# Patient Record
Sex: MALE | Race: BLACK OR AFRICAN AMERICAN | Employment: FULL TIME | URBAN - METROPOLITAN AREA
[De-identification: names, ages, dates, MRNs, and addresses within clinical notes are randomized per-mention and may not be internally consistent; named-entity substitution may affect disease eponyms.]

---

## 2017-04-10 RX ORDER — PREDNISONE 20 MG/1
TABLET ORAL
Qty: 10 TABLET | Refills: 0 | OUTPATIENT
Start: 2017-04-10

## 2017-06-29 PROBLEM — N18.4 STAGE 4 CHRONIC KIDNEY DISEASE (HCC): Status: ACTIVE | Noted: 2017-06-29

## 2017-06-29 RX ORDER — CHOLECALCIFEROL (VITAMIN D3) 1250 MCG
50000 CAPSULE ORAL WEEKLY
COMMUNITY

## 2017-06-29 RX ORDER — HYDROCODONE BITARTRATE AND ACETAMINOPHEN 5; 325 MG/1; MG/1
1 TABLET ORAL EVERY 6 HOURS PRN
Status: ON HOLD | COMMUNITY
End: 2017-06-30

## 2017-06-30 ENCOUNTER — ANESTHESIA (OUTPATIENT)
Dept: SURGERY | Facility: HOSPITAL | Age: 42
End: 2017-06-30
Payer: MEDICAID

## 2017-06-30 ENCOUNTER — ANESTHESIA EVENT (OUTPATIENT)
Dept: SURGERY | Facility: HOSPITAL | Age: 42
End: 2017-06-30
Payer: MEDICAID

## 2017-06-30 ENCOUNTER — SURGERY (OUTPATIENT)
Age: 42
End: 2017-06-30

## 2017-06-30 ENCOUNTER — HOSPITAL ENCOUNTER (OUTPATIENT)
Facility: HOSPITAL | Age: 42
Setting detail: HOSPITAL OUTPATIENT SURGERY
Discharge: HOME | End: 2017-06-30
Attending: SURGERY | Admitting: SURGERY
Payer: MEDICAID

## 2017-06-30 VITALS
BODY MASS INDEX: 36.34 KG/M2 | WEIGHT: 259.56 LBS | HEART RATE: 77 BPM | OXYGEN SATURATION: 100 % | RESPIRATION RATE: 16 BRPM | HEIGHT: 71 IN | DIASTOLIC BLOOD PRESSURE: 82 MMHG | SYSTOLIC BLOOD PRESSURE: 130 MMHG | TEMPERATURE: 98 F

## 2017-06-30 PROBLEM — K42.9 UMBILICAL HERNIA WITHOUT OBSTRUCTION AND WITHOUT GANGRENE: Status: ACTIVE | Noted: 2017-06-30

## 2017-06-30 LAB — POTASSIUM SERPL-SCNC: 3.8 MMOL/L (ref 3.3–5.1)

## 2017-06-30 PROCEDURE — 84132 ASSAY OF SERUM POTASSIUM: CPT | Performed by: SURGERY

## 2017-06-30 PROCEDURE — 36415 COLL VENOUS BLD VENIPUNCTURE: CPT | Performed by: SURGERY

## 2017-06-30 PROCEDURE — 0WQF0ZZ REPAIR ABDOMINAL WALL, OPEN APPROACH: ICD-10-PCS | Performed by: SURGERY

## 2017-06-30 PROCEDURE — 0WHG43Z INSERTION OF INFUSION DEVICE INTO PERITONEAL CAVITY, PERCUTANEOUS ENDOSCOPIC APPROACH: ICD-10-PCS | Performed by: SURGERY

## 2017-06-30 DEVICE — CATH DLYS 62.5CM 2 CUF: Type: IMPLANTABLE DEVICE | Site: ABDOMEN | Status: FUNCTIONAL

## 2017-06-30 RX ORDER — HYDROMORPHONE HYDROCHLORIDE 1 MG/ML
0.2 INJECTION, SOLUTION INTRAMUSCULAR; INTRAVENOUS; SUBCUTANEOUS EVERY 5 MIN PRN
Status: DISCONTINUED | OUTPATIENT
Start: 2017-06-30 | End: 2017-06-30

## 2017-06-30 RX ORDER — ONDANSETRON 4 MG/1
4 TABLET, ORALLY DISINTEGRATING ORAL ONCE
Status: DISCONTINUED | OUTPATIENT
Start: 2017-06-30 | End: 2017-06-30

## 2017-06-30 RX ORDER — ONDANSETRON 2 MG/ML
4 INJECTION INTRAMUSCULAR; INTRAVENOUS ONCE AS NEEDED
Status: DISCONTINUED | OUTPATIENT
Start: 2017-06-30 | End: 2017-06-30

## 2017-06-30 RX ORDER — MORPHINE SULFATE 4 MG/ML
4 INJECTION, SOLUTION INTRAMUSCULAR; INTRAVENOUS EVERY 10 MIN PRN
Status: DISCONTINUED | OUTPATIENT
Start: 2017-06-30 | End: 2017-06-30

## 2017-06-30 RX ORDER — HALOPERIDOL 5 MG/ML
0.25 INJECTION INTRAMUSCULAR ONCE AS NEEDED
Status: DISCONTINUED | OUTPATIENT
Start: 2017-06-30 | End: 2017-06-30

## 2017-06-30 RX ORDER — HYDROCODONE BITARTRATE AND ACETAMINOPHEN 5; 325 MG/1; MG/1
2 TABLET ORAL AS NEEDED
Status: DISCONTINUED | OUTPATIENT
Start: 2017-06-30 | End: 2017-06-30

## 2017-06-30 RX ORDER — MORPHINE SULFATE 4 MG/ML
6 INJECTION, SOLUTION INTRAMUSCULAR; INTRAVENOUS EVERY 10 MIN PRN
Status: DISCONTINUED | OUTPATIENT
Start: 2017-06-30 | End: 2017-06-30

## 2017-06-30 RX ORDER — NALOXONE HYDROCHLORIDE 0.4 MG/ML
80 INJECTION, SOLUTION INTRAMUSCULAR; INTRAVENOUS; SUBCUTANEOUS AS NEEDED
Status: DISCONTINUED | OUTPATIENT
Start: 2017-06-30 | End: 2017-06-30

## 2017-06-30 RX ORDER — SODIUM CHLORIDE, SODIUM LACTATE, POTASSIUM CHLORIDE, CALCIUM CHLORIDE 600; 310; 30; 20 MG/100ML; MG/100ML; MG/100ML; MG/100ML
INJECTION, SOLUTION INTRAVENOUS CONTINUOUS
Status: DISCONTINUED | OUTPATIENT
Start: 2017-06-30 | End: 2017-06-30

## 2017-06-30 RX ORDER — METOCLOPRAMIDE 10 MG/1
10 TABLET ORAL ONCE
Status: COMPLETED | OUTPATIENT
Start: 2017-06-30 | End: 2017-06-30

## 2017-06-30 RX ORDER — SODIUM CHLORIDE 9 MG/ML
INJECTION, SOLUTION INTRAVENOUS CONTINUOUS
Status: DISCONTINUED | OUTPATIENT
Start: 2017-06-30 | End: 2017-06-30

## 2017-06-30 RX ORDER — HYDROCODONE BITARTRATE AND ACETAMINOPHEN 5; 325 MG/1; MG/1
1 TABLET ORAL EVERY 6 HOURS PRN
Qty: 30 TABLET | Refills: 0 | Status: SHIPPED | OUTPATIENT
Start: 2017-06-30 | End: 2017-07-10

## 2017-06-30 RX ORDER — ONDANSETRON 2 MG/ML
INJECTION INTRAMUSCULAR; INTRAVENOUS AS NEEDED
Status: DISCONTINUED | OUTPATIENT
Start: 2017-06-30 | End: 2017-06-30 | Stop reason: SURG

## 2017-06-30 RX ORDER — HYDROCODONE BITARTRATE AND ACETAMINOPHEN 5; 325 MG/1; MG/1
1 TABLET ORAL AS NEEDED
Status: DISCONTINUED | OUTPATIENT
Start: 2017-06-30 | End: 2017-06-30

## 2017-06-30 RX ORDER — BUPIVACAINE HYDROCHLORIDE 2.5 MG/ML
INJECTION, SOLUTION EPIDURAL; INFILTRATION; INTRACAUDAL AS NEEDED
Status: DISCONTINUED | OUTPATIENT
Start: 2017-06-30 | End: 2017-06-30 | Stop reason: HOSPADM

## 2017-06-30 RX ORDER — FAMOTIDINE 20 MG/1
20 TABLET ORAL ONCE
Status: COMPLETED | OUTPATIENT
Start: 2017-06-30 | End: 2017-06-30

## 2017-06-30 RX ORDER — SUCCINYLCHOLINE CHLORIDE 20 MG/ML
INJECTION INTRAMUSCULAR; INTRAVENOUS AS NEEDED
Status: DISCONTINUED | OUTPATIENT
Start: 2017-06-30 | End: 2017-06-30 | Stop reason: SURG

## 2017-06-30 RX ORDER — ROCURONIUM BROMIDE 10 MG/ML
INJECTION, SOLUTION INTRAVENOUS AS NEEDED
Status: DISCONTINUED | OUTPATIENT
Start: 2017-06-30 | End: 2017-06-30 | Stop reason: SURG

## 2017-06-30 RX ORDER — MORPHINE SULFATE 2 MG/ML
2 INJECTION, SOLUTION INTRAMUSCULAR; INTRAVENOUS EVERY 10 MIN PRN
Status: DISCONTINUED | OUTPATIENT
Start: 2017-06-30 | End: 2017-06-30

## 2017-06-30 RX ORDER — ACETAMINOPHEN 325 MG/1
650 TABLET ORAL ONCE
Status: COMPLETED | OUTPATIENT
Start: 2017-06-30 | End: 2017-06-30

## 2017-06-30 RX ORDER — HYDROMORPHONE HYDROCHLORIDE 1 MG/ML
0.6 INJECTION, SOLUTION INTRAMUSCULAR; INTRAVENOUS; SUBCUTANEOUS EVERY 5 MIN PRN
Status: DISCONTINUED | OUTPATIENT
Start: 2017-06-30 | End: 2017-06-30

## 2017-06-30 RX ORDER — LIDOCAINE HYDROCHLORIDE 10 MG/ML
INJECTION, SOLUTION EPIDURAL; INFILTRATION; INTRACAUDAL; PERINEURAL AS NEEDED
Status: DISCONTINUED | OUTPATIENT
Start: 2017-06-30 | End: 2017-06-30 | Stop reason: SURG

## 2017-06-30 RX ORDER — HYDROMORPHONE HYDROCHLORIDE 1 MG/ML
0.4 INJECTION, SOLUTION INTRAMUSCULAR; INTRAVENOUS; SUBCUTANEOUS EVERY 5 MIN PRN
Status: DISCONTINUED | OUTPATIENT
Start: 2017-06-30 | End: 2017-06-30

## 2017-06-30 RX ADMIN — ROCURONIUM BROMIDE 5 MG: 10 INJECTION, SOLUTION INTRAVENOUS at 07:53:00

## 2017-06-30 RX ADMIN — SUCCINYLCHOLINE CHLORIDE 140 MG: 20 INJECTION INTRAMUSCULAR; INTRAVENOUS at 07:54:00

## 2017-06-30 RX ADMIN — LIDOCAINE HYDROCHLORIDE 25 MG: 10 INJECTION, SOLUTION EPIDURAL; INFILTRATION; INTRACAUDAL; PERINEURAL at 07:54:00

## 2017-06-30 RX ADMIN — SODIUM CHLORIDE: 9 INJECTION, SOLUTION INTRAVENOUS at 07:50:00

## 2017-06-30 RX ADMIN — SODIUM CHLORIDE: 9 INJECTION, SOLUTION INTRAVENOUS at 09:15:00

## 2017-06-30 RX ADMIN — ONDANSETRON 4 MG: 2 INJECTION INTRAMUSCULAR; INTRAVENOUS at 08:15:00

## 2017-06-30 NOTE — ANESTHESIA PREPROCEDURE EVALUATION
Anesthesia PreOp Note    HPI:     Elie Smith is a 36year old male who presents for preoperative consultation requested by: Ritta Dakins, MD    Date of Surgery: 6/30/2017    Procedure(s):  LAPAROSCOPIC PERITONEAL DIALYSIS CATH INSERTION  LAPAROSCOPI Epic:  CeFAZolin Sodium (ANCEF/KEFZOL) IVPB premix 2 g 2 g Intravenous Once Harriet Machuca MD   lactated ringers infusion  Intravenous Continuous Rishabh Aguilar MD   metoprolol Tartrate (LOPRESSOR) tab 25 mg 25 mg Oral Once PRN Harriet Machuca MD     No cur negative ROS   Abdominal   (+) obese,     Abdomen: soft.   Bowel sounds: normal.             Anesthesia Plan:   ASA:  4  Plan:   General  Airway:  ETT  Informed Consent Plan and Risks Discussed With:  Patient  Discussed plan with:  CRNA, attending and surge

## 2017-06-30 NOTE — ANESTHESIA POSTPROCEDURE EVALUATION
Patient: Taylor Ortega    Procedure Summary     Date:  06/30/17 Room / Location:  [address] [place] [address] MAIN OR 05 / [address] [place] [address] MAIN OR    Anesthesia Start:  6115 Anesthesia Stop:  0966    Procedures:       LAPAROSCOPIC PERITONEAL DIALYSIS CATH INSERTION (N/A Abdomen)      PAULINA

## 2017-06-30 NOTE — BRIEF OP NOTE
Pre-Operative Diagnosis: renal failure, umbilical hernia     Post-Operative Diagnosis: renal failure, umbilical hernia     Procedure Performed:   Procedure(s):  laparoscopic placement of peritoneal dialysis catheter, repair umbilical hernia      Surgeon

## 2017-06-30 NOTE — H&P
6/30/2017     Samanthaes Q Axel Skiff is a 36year old male With the follow for several years for Decreasing renal function. He now has a hemodialysis catheter placed and wants to proceed with peritoneal dialysis catheter placement.   He states he is already spo    GENERAL HEALTH: , no weight loss, no fever or chills  SKIN: denies any unusual skin rashes or jaundice  HEENT: denies nasal congestion, sinus pain or sore throat;  RESPIRATORY: denies shortness of breath, wheezing or cough   CARDIOVASCULAR: denies romina all possible to again start when he gets back. As an umbilical hernia which we discussed pros and cons of fixing. They best proceed with repair the hernia at this time to avoid interruption of later date if becomes more symptomatic.   I sprayed the proced

## 2017-07-07 NOTE — OPERATIVE REPORT
[facility]    PATIENT'S NAME: Chiquis Khan   ATTENDING PHYSICIAN: Rishabh Pascual MD   OPERATING PHYSICIAN: Rishabh Pascual MD   PATIENT ACCOUNT#:   [id number]    LOCATION:  [address]  MEDICAL RECORD #:   [id number]       DATE OF B under direct vision. We then removed the port and closed the fascia with wide bites in a transverse fashion, 3-0 plain for the subcutaneous tissues and the skin closed with 4-0 Vicryl.     We then placed the port sites to put a scope and then used a graspe

## 2017-07-12 ENCOUNTER — LAB ENCOUNTER (OUTPATIENT)
Dept: LAB | Age: 42
End: 2017-07-12
Attending: ORTHOPAEDIC SURGERY
Payer: MEDICAID

## 2017-07-12 DIAGNOSIS — M25.462 KNEE EFFUSION, LEFT: ICD-10-CM

## 2017-07-12 PROBLEM — M25.562 ACUTE PAIN OF LEFT KNEE: Status: ACTIVE | Noted: 2017-07-12

## 2017-07-12 PROCEDURE — 89060 EXAM SYNOVIAL FLUID CRYSTALS: CPT

## 2017-07-13 LAB — CRYSTALS: NEGATIVE

## 2017-07-13 NOTE — PROGRESS NOTES
Called patient --feeling much better --said \" great\"   Crystals neg--if swelling pain redness should be seen in ER --will see next week -has appt

## 2017-07-18 PROBLEM — M22.42 PATELLA, CHONDROMALACIA, LEFT: Status: ACTIVE | Noted: 2017-07-18

## 2017-07-25 ENCOUNTER — HOSPITAL ENCOUNTER (EMERGENCY)
Facility: HOSPITAL | Age: 42
Discharge: HOME | End: 2017-07-25
Payer: MEDICAID

## 2017-07-25 ENCOUNTER — APPOINTMENT (OUTPATIENT)
Dept: GENERAL RADIOLOGY | Facility: HOSPITAL | Age: 42
End: 2017-07-25
Payer: MEDICAID

## 2017-07-25 VITALS
DIASTOLIC BLOOD PRESSURE: 88 MMHG | OXYGEN SATURATION: 97 % | RESPIRATION RATE: 20 BRPM | HEART RATE: 79 BPM | BODY MASS INDEX: 34.3 KG/M2 | TEMPERATURE: 98 F | HEIGHT: 71 IN | WEIGHT: 245 LBS | SYSTOLIC BLOOD PRESSURE: 133 MMHG

## 2017-07-25 DIAGNOSIS — M25.562 ACUTE PAIN OF LEFT KNEE: Primary | ICD-10-CM

## 2017-07-25 DIAGNOSIS — M25.40 JOINT EFFUSION: ICD-10-CM

## 2017-07-25 LAB
BASOPHILS NFR FLD: 0 %
COLOR FLD: YELLOW
EOSINOPHIL NFR FLD: 0 %
LYMPHOCYTES NFR FLD: 1 %
MONOCYTES NFR FLD: 6 %
NEUTROPHILS NFR CSF: 93 %
RBC # FLD: 26 /CUMM (ref ?–1)
WBC # FLD: 5886 /CUMM (ref ?–1)

## 2017-07-25 PROCEDURE — 96372 THER/PROPH/DIAG INJ SC/IM: CPT

## 2017-07-25 PROCEDURE — 87070 CULTURE OTHR SPECIMN AEROBIC: CPT | Performed by: EMERGENCY MEDICINE

## 2017-07-25 PROCEDURE — 89051 BODY FLUID CELL COUNT: CPT | Performed by: EMERGENCY MEDICINE

## 2017-07-25 PROCEDURE — 20610 DRAIN/INJ JOINT/BURSA W/O US: CPT

## 2017-07-25 PROCEDURE — 89050 BODY FLUID CELL COUNT: CPT | Performed by: EMERGENCY MEDICINE

## 2017-07-25 PROCEDURE — 89060 EXAM SYNOVIAL FLUID CRYSTALS: CPT | Performed by: EMERGENCY MEDICINE

## 2017-07-25 PROCEDURE — 87205 SMEAR GRAM STAIN: CPT | Performed by: EMERGENCY MEDICINE

## 2017-07-25 PROCEDURE — 73560 X-RAY EXAM OF KNEE 1 OR 2: CPT

## 2017-07-25 PROCEDURE — 99285 EMERGENCY DEPT VISIT HI MDM: CPT

## 2017-07-25 RX ORDER — LIDOCAINE HYDROCHLORIDE 10 MG/ML
20 INJECTION, SOLUTION EPIDURAL; INFILTRATION; INTRACAUDAL; PERINEURAL ONCE
Status: COMPLETED | OUTPATIENT
Start: 2017-07-25 | End: 2017-07-25

## 2017-07-25 RX ORDER — MORPHINE SULFATE 4 MG/ML
4 INJECTION, SOLUTION INTRAMUSCULAR; INTRAVENOUS ONCE
Status: COMPLETED | OUTPATIENT
Start: 2017-07-25 | End: 2017-07-25

## 2017-07-25 NOTE — ED PROVIDER NOTES
Patient Seen in: [facility] AND [facility] Emergency Department    History   Patient presents with:  Lower Extremity Injury (musculoskeletal)    Stated Complaint: L knee pain    HPI    79-year-old male with history of gout, left knee swelling presents evaluation above.    PSFH elements reviewed from today and agreed except as otherwise stated in HPI.     Physical Exam   ED Triage Vitals  BP: 151/75 [07/25/17 1240]  Pulse: 80 [07/25/17 1240]  Resp: 16 [07/25/17 1240]  Temp: 98.4 °F (36.9 °C) [07/25/17 1240]  Temp sr will likely reaccumulate, he would still like to proceed with aspiration. Arthrocentesis performed as documented, patient tolerated procedure without difficulty. Await cell counts, plan to discharge if no sign of infection.   Patient verbalizes understand

## 2017-07-25 NOTE — ED NOTES
Discharge instructions given to pt. Pt verbalized understanding of home care and to follow up with ortho. Pt denied further questions or concerns. Pt discharged in stable condition with wife.

## 2017-07-25 NOTE — ED INITIAL ASSESSMENT (HPI)
L knee pain and swelling, chronic hx of knee swelling with repeat drainage. Last drained 2 weeks ago. Has appt tomorrow but cannot wait until then.

## 2017-07-26 LAB — CRYSTALS FLD MICRO: POSITIVE

## 2018-02-22 ENCOUNTER — HOSPITAL ENCOUNTER (INPATIENT)
Facility: HOSPITAL | Age: 43
LOS: 1 days | Discharge: HOME | DRG: 640 | End: 2018-02-23
Attending: EMERGENCY MEDICINE | Admitting: HOSPITALIST
Payer: COMMERCIAL

## 2018-02-22 DIAGNOSIS — R11.2 NAUSEA AND VOMITING, INTRACTABILITY OF VOMITING NOT SPECIFIED, UNSPECIFIED VOMITING TYPE: ICD-10-CM

## 2018-02-22 DIAGNOSIS — E86.0 DEHYDRATION: ICD-10-CM

## 2018-02-22 DIAGNOSIS — N18.6 ESRD (END STAGE RENAL DISEASE) (HCC): Primary | ICD-10-CM

## 2018-02-22 DIAGNOSIS — N19 UREMIA: ICD-10-CM

## 2018-02-22 LAB
ALBUMIN SERPL-MCNC: 2.2 G/DL (ref 3.5–4.8)
ALP LIVER SERPL-CCNC: 142 U/L (ref 45–117)
ALT SERPL-CCNC: 186 U/L (ref 17–63)
AST SERPL-CCNC: 190 U/L (ref 15–41)
BASOPHILS # BLD AUTO: 0.03 X10(3) UL (ref 0–0.1)
BASOPHILS NFR BLD AUTO: 0.5 %
BILIRUB SERPL-MCNC: 0.8 MG/DL (ref 0.1–2)
BUN BLD-MCNC: 53 MG/DL (ref 8–20)
CALCIUM BLD-MCNC: 8.7 MG/DL (ref 8.3–10.3)
CHLORIDE: 98 MMOL/L (ref 101–111)
CO2: 32 MMOL/L (ref 22–32)
CREAT BLD-MCNC: 13.1 MG/DL (ref 0.7–1.3)
EOSINOPHIL # BLD AUTO: 0.1 X10(3) UL (ref 0–0.3)
EOSINOPHIL NFR BLD AUTO: 1.7 %
ERYTHROCYTE [DISTWIDTH] IN BLOOD BY AUTOMATED COUNT: 13.2 % (ref 11.5–16)
GLUCOSE BLD-MCNC: 97 MG/DL (ref 70–99)
HCT VFR BLD AUTO: 34.7 % (ref 37–53)
HGB BLD-MCNC: 11.3 G/DL (ref 13–17)
IMMATURE GRANULOCYTE COUNT: 0.02 X10(3) UL (ref 0–1)
IMMATURE GRANULOCYTE RATIO %: 0.3 %
LYMPHOCYTES # BLD AUTO: 1.32 X10(3) UL (ref 0.9–4)
LYMPHOCYTES NFR BLD AUTO: 22.6 %
M PROTEIN MFR SERPL ELPH: 6.8 G/DL (ref 6.1–8.3)
MCH RBC QN AUTO: 28.9 PG (ref 27–33.2)
MCHC RBC AUTO-ENTMCNC: 32.6 G/DL (ref 31–37)
MCV RBC AUTO: 88.7 FL (ref 80–99)
MONOCYTES # BLD AUTO: 0.8 X10(3) UL (ref 0.1–1)
MONOCYTES NFR BLD AUTO: 13.7 %
NEUTROPHIL ABS PRELIM: 3.56 X10 (3) UL (ref 1.3–6.7)
NEUTROPHILS # BLD AUTO: 3.56 X10(3) UL (ref 1.3–6.7)
NEUTROPHILS NFR BLD AUTO: 61.2 %
PLATELET # BLD AUTO: 496 10(3)UL (ref 150–450)
POTASSIUM SERPL-SCNC: 3.8 MMOL/L (ref 3.6–5.1)
RBC # BLD AUTO: 3.91 X10(6)UL (ref 4.3–5.7)
RED CELL DISTRIBUTION WIDTH-SD: 42.8 FL (ref 35.1–46.3)
SODIUM SERPL-SCNC: 139 MMOL/L (ref 136–144)
WBC # BLD AUTO: 5.8 X10(3) UL (ref 4–13)

## 2018-02-22 PROCEDURE — 99223 1ST HOSP IP/OBS HIGH 75: CPT | Performed by: HOSPITALIST

## 2018-02-22 PROCEDURE — 99253 IP/OBS CNSLTJ NEW/EST LOW 45: CPT | Performed by: INTERNAL MEDICINE

## 2018-02-22 RX ORDER — ACETAMINOPHEN 325 MG/1
650 TABLET ORAL EVERY 6 HOURS PRN
Status: DISCONTINUED | OUTPATIENT
Start: 2018-02-22 | End: 2018-02-23

## 2018-02-22 RX ORDER — DOXAZOSIN 2 MG/1
2 TABLET ORAL EVERY MORNING
COMMUNITY

## 2018-02-22 RX ORDER — ALLOPURINOL 100 MG/1
100 TABLET ORAL EVERY EVENING
Status: DISCONTINUED | OUTPATIENT
Start: 2018-02-22 | End: 2018-02-23

## 2018-02-22 RX ORDER — SODIUM CHLORIDE 9 MG/ML
125 INJECTION, SOLUTION INTRAVENOUS CONTINUOUS
Status: DISCONTINUED | OUTPATIENT
Start: 2018-02-22 | End: 2018-02-23

## 2018-02-22 RX ORDER — ONDANSETRON 2 MG/ML
4 INJECTION INTRAMUSCULAR; INTRAVENOUS EVERY 6 HOURS PRN
Status: DISCONTINUED | OUTPATIENT
Start: 2018-02-22 | End: 2018-02-23

## 2018-02-22 RX ORDER — HYDRALAZINE HYDROCHLORIDE 50 MG/1
100 TABLET, FILM COATED ORAL 2 TIMES DAILY
Status: DISCONTINUED | OUTPATIENT
Start: 2018-02-22 | End: 2018-02-23

## 2018-02-22 RX ORDER — COLCHICINE 0.6 MG/1
0.6 TABLET ORAL DAILY
Status: DISCONTINUED | OUTPATIENT
Start: 2018-02-23 | End: 2018-02-23

## 2018-02-22 RX ORDER — TORSEMIDE 5 MG/1
5 TABLET ORAL EVERY EVENING
Status: DISCONTINUED | OUTPATIENT
Start: 2018-02-22 | End: 2018-02-23

## 2018-02-22 RX ORDER — CARVEDILOL 12.5 MG/1
25 TABLET ORAL 2 TIMES DAILY WITH MEALS
Status: DISCONTINUED | OUTPATIENT
Start: 2018-02-22 | End: 2018-02-23

## 2018-02-22 RX ORDER — ONDANSETRON 2 MG/ML
4 INJECTION INTRAMUSCULAR; INTRAVENOUS ONCE
Status: COMPLETED | OUTPATIENT
Start: 2018-02-22 | End: 2018-02-22

## 2018-02-22 RX ORDER — ERGOCALCIFEROL 1.25 MG/1
50000 CAPSULE ORAL WEEKLY
Status: DISCONTINUED | OUTPATIENT
Start: 2018-02-28 | End: 2018-02-23

## 2018-02-22 RX ORDER — TERAZOSIN 2 MG/1
2 CAPSULE ORAL NIGHTLY
Status: DISCONTINUED | OUTPATIENT
Start: 2018-02-22 | End: 2018-02-23

## 2018-02-22 RX ORDER — FOLIC ACID/VIT B COMPLEX AND C 0.8 MG
TABLET ORAL
COMMUNITY

## 2018-02-22 RX ORDER — ONDANSETRON 2 MG/ML
4 INJECTION INTRAMUSCULAR; INTRAVENOUS ONCE
Status: DISCONTINUED | OUTPATIENT
Start: 2018-02-22 | End: 2018-02-22

## 2018-02-22 RX ORDER — CINACALCET 30 MG/1
30 TABLET, FILM COATED ORAL
COMMUNITY

## 2018-02-22 RX ORDER — CINACALCET 30 MG/1
30 TABLET, FILM COATED ORAL
Status: DISCONTINUED | OUTPATIENT
Start: 2018-02-23 | End: 2018-02-23

## 2018-02-22 RX ORDER — CALCITRIOL 0.25 UG/1
0.5 CAPSULE, LIQUID FILLED ORAL EVERY EVENING
Status: DISCONTINUED | OUTPATIENT
Start: 2018-02-22 | End: 2018-02-23

## 2018-02-22 RX ORDER — AMLODIPINE BESYLATE 5 MG/1
10 TABLET ORAL EVERY EVENING
Status: DISCONTINUED | OUTPATIENT
Start: 2018-02-22 | End: 2018-02-23

## 2018-02-22 RX ORDER — SODIUM CHLORIDE 9 MG/ML
INJECTION, SOLUTION INTRAVENOUS ONCE
Status: COMPLETED | OUTPATIENT
Start: 2018-02-22 | End: 2018-02-22

## 2018-02-22 NOTE — ED PROVIDER NOTES
Patient Seen in: [facility] Emergency Department    History   Patient presents with:  Dizziness (neurologic)  Dehydration (metabolic/constitutional)    Stated Complaint: pt on home dialysis, pt c/o nvd, pt c/o light heaededness    HPI    This a 44-y pallor. Oropharynx clear, mucous membranes dry. Neck: supple, no rigidity   Lungs: good air exchange and clear   Heart: regular rate rhythm and no murmur   Abdomen: Soft and nontender. No abdominal masses.   No peritoneal signs   Extremities: no edema, n diagnosis)  Uremia  Nausea and vomiting, intractability of vomiting not specified, unspecified vomiting type  Dehydration    Disposition:  Admit  2/22/2018  5:21 pm    Follow-up:  No follow-up provider specified.       Medications Prescribed:  Current Disch

## 2018-02-22 NOTE — ED INITIAL ASSESSMENT (HPI)
Home peritoneal dialysis, indicating that it is not taking fluids off his body. C/o n/v and dry mouth.

## 2018-02-22 NOTE — CONSULTS
[facility]  Report of Consultation    Kim Vizcarra Patient Status:  Emergency    1975 MRN [id number]   Location [address] [facility] Attending Anand Ellsworth MD   Hosp Day # 0 PCP [facility]     Reason for Consultation 1 tablet by mouth once a week. Sevelamer Carbonate (RENVELA) 800 MG Oral Tab Take 800 mg by mouth 3 (three) times daily with meals. colchicine 0.6 MG Oral Tab Take 0.6 mg by mouth daily.    allopurinol (ZYLOPRIM) 100 MG Oral Tab Take 100 mg by mouth valarie BILT 0.8 02/22/2018   TP 6.8 02/22/2018    02/22/2018    02/22/2018         BUN (mg/dL)   Date Value   02/22/2018 53 (H)   12/06/2015 54 (H)   ----------  Creatinine (mg/dL)   Date Value   02/22/2018 13.10 (H)   12/06/2015 4.71 (H)   ------

## 2018-02-22 NOTE — H&P
[facility]  History and Physical     Susan Mendes Patient Status:  Emergency    1975 MRN [id number]   Location 656 [facility] Attending Jessy Munoz MD   Hosp Day # 0 PCP [facility]     Chief Complaint: na Disp:  Rfl:    colchicine 0.6 MG Oral Tab Take 0.6 mg by mouth daily. Disp:  Rfl:    allopurinol (ZYLOPRIM) 100 MG Oral Tab Take 100 mg by mouth daily. Disp:  Rfl:    calcitRIOL (ROCALTROL) 0.5 MCG Oral Cap Take 0.5 mcg by mouth daily.  Disp:  Rfl:        R with PD  2. Mild fluid OL  1. Minimal hypoxia when he talks  2. Monitor with PD  3. HTN  1. Cont. Home meds  4. Transaminase elevation  1. Likely due to congestion  2. Monitor with dialysis  3. Further w/u  If persists  5. LLE fx  1.  In boot      Quality:

## 2018-02-22 NOTE — ED NOTES
Pt sts does at home dialysis peritoneal sts he hasn't been getting all the fluid off. Patient c/o nausea/ vomiting trough the night.

## 2018-02-23 VITALS
OXYGEN SATURATION: 97 % | RESPIRATION RATE: 18 BRPM | SYSTOLIC BLOOD PRESSURE: 143 MMHG | TEMPERATURE: 98 F | HEART RATE: 69 BPM | DIASTOLIC BLOOD PRESSURE: 97 MMHG | BODY MASS INDEX: 35.7 KG/M2 | HEIGHT: 71 IN | WEIGHT: 255 LBS

## 2018-02-23 PROBLEM — R11.2 NAUSEA AND VOMITING: Status: ACTIVE | Noted: 2018-02-22

## 2018-02-23 LAB
ALBUMIN SERPL-MCNC: 1.9 G/DL (ref 3.5–4.8)
ALP LIVER SERPL-CCNC: 118 U/L (ref 45–117)
ALT SERPL-CCNC: 111 U/L (ref 17–63)
AST SERPL-CCNC: 72 U/L (ref 15–41)
BILIRUB SERPL-MCNC: 0.4 MG/DL (ref 0.1–2)
BUN BLD-MCNC: 45 MG/DL (ref 8–20)
BUN BLD-MCNC: 47 MG/DL (ref 8–20)
BUN BLD-MCNC: 49 MG/DL (ref 8–20)
CALCIUM BLD-MCNC: 7.9 MG/DL (ref 8.3–10.3)
CALCIUM BLD-MCNC: 8.2 MG/DL (ref 8.3–10.3)
CALCIUM BLD-MCNC: 8.2 MG/DL (ref 8.3–10.3)
CHLORIDE: 98 MMOL/L (ref 101–111)
CHLORIDE: 98 MMOL/L (ref 101–111)
CHLORIDE: 99 MMOL/L (ref 101–111)
CO2: 28 MMOL/L (ref 22–32)
CO2: 30 MMOL/L (ref 22–32)
CO2: 33 MMOL/L (ref 22–32)
CREAT BLD-MCNC: 11.7 MG/DL (ref 0.7–1.3)
CREAT BLD-MCNC: 11.9 MG/DL (ref 0.7–1.3)
CREAT BLD-MCNC: 12.2 MG/DL (ref 0.7–1.3)
GLUCOSE BLD-MCNC: 101 MG/DL (ref 70–99)
GLUCOSE BLD-MCNC: 93 MG/DL (ref 70–99)
GLUCOSE BLD-MCNC: 94 MG/DL (ref 70–99)
M PROTEIN MFR SERPL ELPH: 5.6 G/DL (ref 6.1–8.3)
POTASSIUM SERPL-SCNC: 2.7 MMOL/L (ref 3.6–5.1)
POTASSIUM SERPL-SCNC: 3 MMOL/L (ref 3.6–5.1)
POTASSIUM SERPL-SCNC: 4 MMOL/L (ref 3.6–5.1)
SODIUM SERPL-SCNC: 135 MMOL/L (ref 136–144)
SODIUM SERPL-SCNC: 137 MMOL/L (ref 136–144)
SODIUM SERPL-SCNC: 140 MMOL/L (ref 136–144)

## 2018-02-23 PROCEDURE — 99232 SBSQ HOSP IP/OBS MODERATE 35: CPT | Performed by: INTERNAL MEDICINE

## 2018-02-23 PROCEDURE — 99238 HOSP IP/OBS DSCHRG MGMT 30/<: CPT | Performed by: INTERNAL MEDICINE

## 2018-02-23 PROCEDURE — 3E1M39Z IRRIGATION OF PERITONEAL CAVITY USING DIALYSATE, PERCUTANEOUS APPROACH: ICD-10-PCS | Performed by: INTERNAL MEDICINE

## 2018-02-23 RX ORDER — POTASSIUM CHLORIDE 20 MEQ/1
40 TABLET, EXTENDED RELEASE ORAL DAILY
Status: DISCONTINUED | OUTPATIENT
Start: 2018-02-23 | End: 2018-02-23

## 2018-02-23 RX ORDER — POTASSIUM CHLORIDE 20 MEQ/1
40 TABLET, EXTENDED RELEASE ORAL ONCE
Status: COMPLETED | OUTPATIENT
Start: 2018-02-23 | End: 2018-02-23

## 2018-02-23 RX ORDER — FAMOTIDINE 10 MG/ML
20 INJECTION, SOLUTION INTRAVENOUS DAILY
Status: DISCONTINUED | OUTPATIENT
Start: 2018-02-23 | End: 2018-02-23

## 2018-02-23 NOTE — PROGRESS NOTES
NURSING ADMISSION NOTE      Patient admitted via Cart  Oriented to room. Safety precautions initiated. Bed in low position. Call light in reach. Pt AOx4. No c/o pain.  Admitted for peritoneal dialysis tonight and to sort out issues with his Audrey Strange

## 2018-02-23 NOTE — PROGRESS NOTES
[facility]  Nephrology Progress Note    Kim Vizcarra Patient Status:  Observation    1975 MRN [id number]   [facility] 4NW-A Attending Jessica Galo MD   Hosp Day # 0 PCP [facility]       SUBJECTIVE:  Feels better today alt (APRESOLINE) tab 100 mg 100 mg Oral BID   colchicine tab 0.6 mg 0.6 mg Oral Daily   AmLODIPine Besylate (NORVASC) tab 10 mg 10 mg Oral QPM   carvedilol (COREG) tab 25 mg 25 mg Oral BID with meals   torsemide (DEMADEX) tab 5 mg 5 mg Oral QPM   allopurinol (

## 2018-02-23 NOTE — PLAN OF CARE
GASTROINTESTINAL - ADULT    • Minimal or absence of nausea and vomiting Progressing        METABOLIC/FLUID AND ELECTROLYTES - ADULT    • Hemodynamic stability and optimal renal function maintained Progressing          Patient A+Ox4. Denies any pain.  BP maite

## 2018-02-23 NOTE — PLAN OF CARE
METABOLIC/FLUID AND ELECTROLYTES - ADULT    • Electrolytes maintained within normal limits Not Progressing          GASTROINTESTINAL - ADULT    • Minimal or absence of nausea and vomiting Progressing        METABOLIC/FLUID AND ELECTROLYTES - ADULT    • Hem

## 2018-02-23 NOTE — CM/SW NOTE
02/23/18 1100   CM/SW Referral Data   Referral Source Social Work (self-referral)   Reason for Referral Discharge planning   Informant Patient   Patient Info   Patient's Mental Status Alert;Oriented   Patient's Home Environment House   Patient lives wit

## 2018-02-23 NOTE — PAYOR COMM NOTE
--------------  ADMISSION REVIEW     Payor: Tyree Shipley #:  [id number]  Authorization Number: N/A    Admit date: 2/23/18  Admit time: 36       Admitting Physician: Anish Bernardo MD  Attending Physician:  Leia Valentin except as noted above.     Physical Exam[TS.1]   ED Triage Vitals [02/22/18 1301]  BP: 123/92  Pulse: 88  Resp: 18  Temp: 98.9 °F (37.2 °C)  Temp src: Temporal  SpO2: 100 %  O2 Device: None (Room air)[TS.2]    Current:[TS.1]BP (!) 138/103   Pulse 64   Temp type  Dehydration[TS. 2]    Disposition:[TS.1]  Admit  2/22/2018  5:21 pm[TS.2]    Present on Admission  Date Reviewed: 7/26/2017          ICD-10-CM Noted POA    ESRD (end stage renal disease) ([facility] [address] N18.6 Unknown Unknown[TS.2]     Chandler Dover MD on 2/ once a week. Disp:  Rfl:    Sevelamer Carbonate (RENVELA) 800 MG Oral Tab Take 800 mg by mouth 3 (three) times daily with meals. Disp:  Rfl:    colchicine 0.6 MG Oral Tab Take 0.6 mg by mouth daily.  Disp:  Rfl:    allopurinol (ZYLOPRIM) 100 MG Oral Tab [facility] in the last 72 hours. Imaging: Imaging data reviewed in Epic. ASSESSMENT / PLAN:[FA.1]     1. N/V  1. Likely due to uremia  2. Monitor with PD  2. Mild fluid OL  1. Minimal hypoxia when he talks  2. Monitor with PD  3. HTN  1. Cont. Home meds[FA.2]  4. Shantel Holt RN      0.9%  NaCl infusion        Date Action Dose Route User    2/22/2018 1424 New Bag 125 mL/hr Intravenous Mike Maria RN      0.9%  NaCl infusion     Date Action Dose Route User    2/22/2018 1546 New Bag (none) Intravenous Fan

## 2018-02-23 NOTE — PROGRESS NOTES
[facility]  Progress Note    Jessica Vale Patient Status:  Observation    1975 MRN [id number]   [facility] 4NW-A Attending Leodan Fernandes MD   Hosp Day # 0 PCP [facility]     CC: Nausea and vomiting    SUBJECTIVE:  Patient  02/23/2018   K 3.0 02/23/2018   CL 98 02/23/2018   CO2 30.0 02/23/2018   GLU 94 02/23/2018   CA 8.2 02/23/2018   ALB 1.9 02/23/2018   ALKPHO 118 02/23/2018   BILT 0.4 02/23/2018   TP 5.6 02/23/2018   AST 72 02/23/2018    02/23/2018 is expected to be discharge to: Home        Questions/concerns and Plan of care were discussed with patient   Follow-up with regular primary care physician [facility] within 1 week in office and nephrologist as directed    Sunshine Cam MD  2/23/2018

## 2018-02-23 NOTE — DISCHARGE SUMMARY
[facility]  Discharge Summary    Sherry Jackson Patient Status:  Inpatient    1975 MRN [id number]   [facility] 4NW-A Attending Elis Weston MD   [facility] Day # 0 [facility]     Date of Admission: 2018    Date of [address] blood pressure controlled. Elevated liver function tests possibly due to nausea and vomiting and hepatic congestion due to uremia and fluid overload, liver function test improving after dialysis.   Nephrologist okay to discharge today as tolerating diet wi Refills:  0     Vitamin D3 88386 units Caps      Take 50,000 Units by mouth once a week. Refills:  0        STOP taking these medications    colchicine 0.6 MG Tabs               Follow up Visits:  Follow-up with [facility] in 1 week    [facility]

## 2018-03-18 ENCOUNTER — HOSPITAL ENCOUNTER (INPATIENT)
Facility: HOSPITAL | Age: 43
LOS: 1 days | Discharge: HOME | DRG: 919 | End: 2018-03-20
Attending: EMERGENCY MEDICINE | Admitting: HOSPITALIST
Payer: COMMERCIAL

## 2018-03-18 DIAGNOSIS — K66.8 PNEUMOPERITONEUM: Primary | ICD-10-CM

## 2018-03-18 DIAGNOSIS — T85.611A PERITONEAL DIALYSIS CATHETER DYSFUNCTION, INITIAL ENCOUNTER (HCC): ICD-10-CM

## 2018-03-19 ENCOUNTER — APPOINTMENT (OUTPATIENT)
Dept: CT IMAGING | Facility: HOSPITAL | Age: 43
DRG: 919 | End: 2018-03-19
Attending: EMERGENCY MEDICINE
Payer: COMMERCIAL

## 2018-03-19 PROBLEM — K66.8 PNEUMOPERITONEUM: Status: ACTIVE | Noted: 2018-03-19

## 2018-03-19 PROBLEM — T85.611A PERITONEAL DIALYSIS CATHETER DYSFUNCTION, INITIAL ENCOUNTER (HCC): Status: ACTIVE | Noted: 2018-03-19

## 2018-03-19 PROBLEM — T85.611A: Status: ACTIVE | Noted: 2018-03-19

## 2018-03-19 LAB
ALBUMIN SERPL-MCNC: 2.3 G/DL (ref 3.5–4.8)
ALP LIVER SERPL-CCNC: 96 U/L (ref 45–117)
ALT SERPL-CCNC: 14 U/L (ref 17–63)
AST SERPL-CCNC: 12 U/L (ref 15–41)
BASOPHIL PERITONEAL FLUID: 0 %
BASOPHILS # BLD AUTO: 0.02 X10(3) UL (ref 0–0.1)
BASOPHILS # BLD AUTO: 0.04 X10(3) UL (ref 0–0.1)
BASOPHILS NFR BLD AUTO: 0.2 %
BASOPHILS NFR BLD AUTO: 0.5 %
BILIRUB SERPL-MCNC: 0.4 MG/DL (ref 0.1–2)
BILIRUB UR QL STRIP.AUTO: NEGATIVE
BUN BLD-MCNC: 37 MG/DL (ref 8–20)
BUN BLD-MCNC: 37 MG/DL (ref 8–20)
CALCIUM BLD-MCNC: 7.6 MG/DL (ref 8.3–10.3)
CALCIUM BLD-MCNC: 7.8 MG/DL (ref 8.3–10.3)
CHLORIDE: 103 MMOL/L (ref 101–111)
CHLORIDE: 105 MMOL/L (ref 101–111)
CLARITY UR REFRACT.AUTO: CLEAR
CO2: 30 MMOL/L (ref 22–32)
CO2: 33 MMOL/L (ref 22–32)
COLOR UR AUTO: YELLOW
CREAT BLD-MCNC: 9.87 MG/DL (ref 0.7–1.3)
CREAT BLD-MCNC: 9.94 MG/DL (ref 0.7–1.3)
EOSINOPHIL # BLD AUTO: 0.3 X10(3) UL (ref 0–0.3)
EOSINOPHIL # BLD AUTO: 0.31 X10(3) UL (ref 0–0.3)
EOSINOPHIL NFR BLD AUTO: 3.1 %
EOSINOPHIL NFR BLD AUTO: 3.4 %
EOSINOPHILS PERITONEAL FLUID: 0 %
ERYTHROCYTE [DISTWIDTH] IN BLOOD BY AUTOMATED COUNT: 13.9 % (ref 11.5–16)
ERYTHROCYTE [DISTWIDTH] IN BLOOD BY AUTOMATED COUNT: 14 % (ref 11.5–16)
GLUCOSE BLD-MCNC: 101 MG/DL (ref 70–99)
GLUCOSE BLD-MCNC: 88 MG/DL (ref 70–99)
GLUCOSE UR STRIP.AUTO-MCNC: NEGATIVE MG/DL
HCT VFR BLD AUTO: 27.6 % (ref 37–53)
HCT VFR BLD AUTO: 29.6 % (ref 37–53)
HGB BLD-MCNC: 9.1 G/DL (ref 13–17)
HGB BLD-MCNC: 9.6 G/DL (ref 13–17)
IMMATURE GRANULOCYTE COUNT: 0.03 X10(3) UL (ref 0–1)
IMMATURE GRANULOCYTE COUNT: 0.04 X10(3) UL (ref 0–1)
IMMATURE GRANULOCYTE RATIO %: 0.3 %
IMMATURE GRANULOCYTE RATIO %: 0.5 %
KETONES UR STRIP.AUTO-MCNC: NEGATIVE MG/DL
LEUKOCYTE ESTERASE UR QL STRIP.AUTO: NEGATIVE
LIPASE: 98 U/L (ref 73–393)
LYMPHOCYTE PERITONEAL FLUID: 6 %
LYMPHOCYTES # BLD AUTO: 1.93 X10(3) UL (ref 0.9–4)
LYMPHOCYTES # BLD AUTO: 2.05 X10(3) UL (ref 0.9–4)
LYMPHOCYTES NFR BLD AUTO: 20.8 %
LYMPHOCYTES NFR BLD AUTO: 21.9 %
M PROTEIN MFR SERPL ELPH: 6.3 G/DL (ref 6.1–8.3)
MCH RBC QN AUTO: 28.9 PG (ref 27–33.2)
MCH RBC QN AUTO: 29.5 PG (ref 27–33.2)
MCHC RBC AUTO-ENTMCNC: 32.4 G/DL (ref 31–37)
MCHC RBC AUTO-ENTMCNC: 33 G/DL (ref 31–37)
MCV RBC AUTO: 89.2 FL (ref 80–99)
MCV RBC AUTO: 89.6 FL (ref 80–99)
MONO/MAC/HISTIO PERITONEAL: 12 %
MONOCYTES # BLD AUTO: 1 X10(3) UL (ref 0.1–1)
MONOCYTES # BLD AUTO: 1.11 X10(3) UL (ref 0.1–1)
MONOCYTES NFR BLD AUTO: 11.3 %
MONOCYTES NFR BLD AUTO: 11.4 %
NEUTROPHIL ABS PRELIM: 5.5 X10 (3) UL (ref 1.3–6.7)
NEUTROPHIL ABS PRELIM: 6.33 X10 (3) UL (ref 1.3–6.7)
NEUTROPHILS # BLD AUTO: 5.5 X10(3) UL (ref 1.3–6.7)
NEUTROPHILS # BLD AUTO: 6.33 X10(3) UL (ref 1.3–6.7)
NEUTROPHILS NFR BLD AUTO: 62.3 %
NEUTROPHILS NFR BLD AUTO: 64.3 %
NEUTROPHILS PERITONEAL FLUID: 82 %
NITRITE UR QL STRIP.AUTO: NEGATIVE
PH UR STRIP.AUTO: 6 [PH] (ref 4.5–8)
PLATELET # BLD AUTO: 334 10(3)UL (ref 150–450)
PLATELET # BLD AUTO: 371 10(3)UL (ref 150–450)
POTASSIUM SERPL-SCNC: 3 MMOL/L (ref 3.6–5.1)
POTASSIUM SERPL-SCNC: 3 MMOL/L (ref 3.6–5.1)
PROT UR STRIP.AUTO-MCNC: 100 MG/DL
RBC # BLD AUTO: 3.08 X10(6)UL (ref 4.3–5.7)
RBC # BLD AUTO: 3.32 X10(6)UL (ref 4.3–5.7)
RBC PERITONEAL FLUID: NORMAL /MM3
RBC UR QL AUTO: NEGATIVE
RED CELL DISTRIBUTION WIDTH-SD: 45.4 FL (ref 35.1–46.3)
RED CELL DISTRIBUTION WIDTH-SD: 45.8 FL (ref 35.1–46.3)
SODIUM SERPL-SCNC: 142 MMOL/L (ref 136–144)
SODIUM SERPL-SCNC: 144 MMOL/L (ref 136–144)
SP GR UR STRIP.AUTO: 1.01 (ref 1–1.03)
TOTAL CELLS COUNTED: 100
UROBILINOGEN UR STRIP.AUTO-MCNC: <2 MG/DL
WBC # BLD AUTO: 8.8 X10(3) UL (ref 4–13)
WBC # BLD AUTO: 9.9 X10(3) UL (ref 4–13)
WBC PERITONEAL FLUID: NORMAL /MM3

## 2018-03-19 PROCEDURE — 99223 1ST HOSP IP/OBS HIGH 75: CPT | Performed by: INTERNAL MEDICINE

## 2018-03-19 PROCEDURE — 74176 CT ABD & PELVIS W/O CONTRAST: CPT | Performed by: EMERGENCY MEDICINE

## 2018-03-19 PROCEDURE — 99220 INITIAL OBSERVATION CARE,LEVL III: CPT | Performed by: SURGERY

## 2018-03-19 PROCEDURE — 99220 INITIAL OBSERVATION CARE,LEVL III: CPT | Performed by: HOSPITALIST

## 2018-03-19 RX ORDER — ASCORBIC ACID, THIAMINE, RIBOFLAVIN, NIACINAMIDE, PYRIDOXINE, FOLIC ACID, COBALAMIN, BIOTIN, PANTOTHENIC ACID 100; 1.5; 1.7; 20; 10; 1; 6; 300; 1 MG/1; MG/1; MG/1; MG/1; MG/1; MG/1; UG/1; UG/1; MG/1
1 TABLET, COATED ORAL DAILY
Status: DISCONTINUED | OUTPATIENT
Start: 2018-03-19 | End: 2018-03-20

## 2018-03-19 RX ORDER — CINACALCET 30 MG/1
30 TABLET, FILM COATED ORAL
Status: DISCONTINUED | OUTPATIENT
Start: 2018-03-19 | End: 2018-03-19

## 2018-03-19 RX ORDER — AMLODIPINE BESYLATE 5 MG/1
10 TABLET ORAL DAILY
Status: DISCONTINUED | OUTPATIENT
Start: 2018-03-19 | End: 2018-03-20

## 2018-03-19 RX ORDER — CARVEDILOL 12.5 MG/1
25 TABLET ORAL 2 TIMES DAILY WITH MEALS
Status: DISCONTINUED | OUTPATIENT
Start: 2018-03-19 | End: 2018-03-20

## 2018-03-19 RX ORDER — TERAZOSIN 2 MG/1
2 CAPSULE ORAL DAILY
Status: DISCONTINUED | OUTPATIENT
Start: 2018-03-19 | End: 2018-03-20

## 2018-03-19 RX ORDER — HYDRALAZINE HYDROCHLORIDE 50 MG/1
100 TABLET, FILM COATED ORAL EVERY 8 HOURS SCHEDULED
Status: DISCONTINUED | OUTPATIENT
Start: 2018-03-19 | End: 2018-03-20

## 2018-03-19 RX ORDER — TORSEMIDE 5 MG/1
10 TABLET ORAL DAILY
Status: DISCONTINUED | OUTPATIENT
Start: 2018-03-19 | End: 2018-03-20

## 2018-03-19 RX ORDER — ONDANSETRON 2 MG/ML
4 INJECTION INTRAMUSCULAR; INTRAVENOUS EVERY 6 HOURS PRN
Status: DISCONTINUED | OUTPATIENT
Start: 2018-03-19 | End: 2018-03-20

## 2018-03-19 RX ORDER — MORPHINE SULFATE 4 MG/ML
2 INJECTION, SOLUTION INTRAMUSCULAR; INTRAVENOUS EVERY 2 HOUR PRN
Status: DISCONTINUED | OUTPATIENT
Start: 2018-03-19 | End: 2018-03-20

## 2018-03-19 RX ORDER — MORPHINE SULFATE 4 MG/ML
1 INJECTION, SOLUTION INTRAMUSCULAR; INTRAVENOUS EVERY 2 HOUR PRN
Status: DISCONTINUED | OUTPATIENT
Start: 2018-03-19 | End: 2018-03-20

## 2018-03-19 RX ORDER — ALLOPURINOL 100 MG/1
100 TABLET ORAL DAILY
Status: DISCONTINUED | OUTPATIENT
Start: 2018-03-19 | End: 2018-03-20

## 2018-03-19 RX ORDER — MORPHINE SULFATE 4 MG/ML
4 INJECTION, SOLUTION INTRAMUSCULAR; INTRAVENOUS ONCE
Status: COMPLETED | OUTPATIENT
Start: 2018-03-19 | End: 2018-03-19

## 2018-03-19 RX ORDER — LACTULOSE 10 G/15ML
30 SOLUTION ORAL ONCE
Status: COMPLETED | OUTPATIENT
Start: 2018-03-19 | End: 2018-03-19

## 2018-03-19 RX ORDER — MORPHINE SULFATE 4 MG/ML
4 INJECTION, SOLUTION INTRAMUSCULAR; INTRAVENOUS EVERY 2 HOUR PRN
Status: DISCONTINUED | OUTPATIENT
Start: 2018-03-19 | End: 2018-03-20

## 2018-03-19 NOTE — PROGRESS NOTES
[facility] Dosing Service    Initial Pharmacokinetic Consult for Aminoglycoside Dosing      Anuj Crowder is a 39year old male who is being treated for peritonitis. Pharmacy has been asked to dose gentamicin  by Dr. Sil Arora.     He has No Known Allergie dialysis with q6h exchanges per protocol. 3.  Pharmacy will follow and monitor renal function changes, toxicity and efficacy. Pharmacy will continue to follow him. We appreciate the opportunity to assist in his care.     Leif Berg, RaisaD

## 2018-03-19 NOTE — PLAN OF CARE
Dialysis RN here and only able to obtain about 75mL of fluid aspirate from abdomen. Pain with flushing, dialysis RN requesting page to Dr. Elena Stewart before proceeding. Will send page and give MD number of dialysis RN.     Fluid aspirate was walked down to lab

## 2018-03-19 NOTE — PLAN OF CARE
1003: Will page Dr. Nola Quiroz from general surgery notified that pt is having resistance when dialysis RN is trying to aspirate fluid.  Will infuse 200mL before 2000mL first to see if he can aspirate the smaller amount first.     1043: Able to infuse 1243

## 2018-03-19 NOTE — ED INITIAL ASSESSMENT (HPI)
Patient her with abdominal pain to lower abdomen. Patient states his peritoneal dialysis catheter machine is showing that he has no drainage. Patient thinks the catheter may be obstructed. Patient states pain is worse to his lower right abdomen.

## 2018-03-19 NOTE — PROGRESS NOTES
[facility] dosing service    Initial Pharmacokinetic Consult for Vancomycin Dosing     Samanthaelva Dickerson is a 39year old male admitted on 3/18/18 who is being treated for peritonitis.   Pharmacy has been asked to dose Vancomycin by Dr. Mauricio Ordonez    He has No K kidneys. Unremarkable appendix. Based on the above:    1. This patient will receive a loading dose of Vancomycin  2 gm IVPB (25mg/kg, capped at 2g) x 1 dose today based upon, adjusted body weight, renal function, and pharmacokinetics.     2.  Pharmac

## 2018-03-19 NOTE — PAYOR COMM NOTE
--------------  ADMISSION REVIEW     Payor: Tyree Shipley #:  [id number]  Authorization Number: N/A    Admit date: N/A  Admit time: N/A       Admitting Physician: Efrain Templeton MD  Attending Physician:  Daljit Reyes Current:[SA.1]BP (!) 153/106   Pulse 84   Temp 98.1 °F (36.7 °C) (Temporal)   Resp 20   Ht 181.6 cm (5' 11.5\")   Wt 108.9 kg   SpO2 95%   BMI 33.01 kg/m²[SA.2]     Physical Exam   Constitutional: He is oriented to person, place, and time.  He appears well- CT scan of the abdomen pelvis was performed. Is a moderate amount. Pneumoperitoneum and small amount of ascites.   This may be related to peritoneal dialysis although hollow organ perforation could also have this appearance no hollow organ perforation is History of Present Illness: Jf Gusman is a 39year old male with a history of ESRD on PD and Essential hypertension who presents to the ER with complaints of abdominal pain and PD cath malfunction. [PT.1] Patient states last month he was having issue BP (!) 153/106   Pulse 84   Temp 98.1 °F (36.7 °C) (Temporal)   Resp 20   Ht 5' 11.5\" (1.816 m)   Wt 240 lb (108.9 kg)   SpO2 95%   BMI 33.01 kg/m²    General: No acute distress. Alert and oriented x 3. HEENT: Normocephalic atraumatic.  Moist mucous memb 3/19/2018 0745 Given 100 mg Oral Marian Aguilar, RN      alteplase (ACTIVASE) 2 mg in Sterile Water for Injection 2.2 mL IV push     Date Action Dose Route User    3/19/2018 0448 Given 2 mg Intravenous Sal Oconnor, RN      AmLODIPine Besylate (NOR

## 2018-03-19 NOTE — PLAN OF CARE
GASTROINTESTINAL - ADULT    • Minimal or absence of nausea and vomiting Progressing    • Maintains or returns to baseline bowel function Progressing        HEMATOLOGIC - ADULT    • Free from bleeding injury Progressing        METABOLIC/FLUID AND ELECTROLYT

## 2018-03-19 NOTE — H&P
[facility]  History and Physical     Delgadillo Luis Patient Status:  Emergency    1975 MRN [id number]   Location [facility] Attending Puneet Wilson MD   Hosp Day # 0 [place] [facility] [place] [place]     Chief Complaint: Topher Merritt Cholecalciferol (VITAMIN D3) 96922 units Oral Cap Take 50,000 Units by mouth once a week. Disp:  Rfl:    HydrALAZINE HCl (APRESOLINE) 100 MG Oral Tab Take 100 mg by mouth 2 (two) times daily.  Disp:  Rfl:    Sevelamer Carbonate (RENVELA) 800 MG Oral Tab / PLAN:     1. Abdominal pain, CT with free air- suspect d/t PD cath, no other acute findings on prelim CT report; pain may be related to PD cath malfunction  2. ESRD on PD   3. Anemia, chronic disease  4. Hypokalemia  5. Metabolic alkalosis  6.  Essential

## 2018-03-19 NOTE — ED NOTES
Pt returns from CT, requesting additional pain medication. Pt medicated as ordered.  Pt reports he does make a small amount of urine, attempting to provide sample at this time

## 2018-03-19 NOTE — ED PROVIDER NOTES
Patient Seen in: [facility] Emergency Department    History   Patient presents with:  Abdomen/Flank Pain (GI/)    Stated Complaint: abd pain     HPI    Patient is a 26-year-old gentleman with end-stage renal disease on peritoneal dialysis here wit Neck: Normal range of motion. Neck supple. Cardiovascular: Normal rate and intact distal pulses. Pulmonary/Chest: Effort normal. No respiratory distress. Abdominal: Soft. He exhibits no distension. There is tenderness.  There is no rebound and no gua ------------------------------------------------------------       MDM       CT scan of the abdomen pelvis was performed. Is a moderate amount. Pneumoperitoneum and small amount of ascites.   This may be related to peritoneal dialysis although hollow orga

## 2018-03-19 NOTE — PLAN OF CARE
1436: Dr. Payam Daniels paged about peritoneal fluid having gram positive rods and +4 WBCs. 1519: Gen surg would like Dr. Tracie Babinski notified as well and are ok with empirical abx if that is wanted. Also will page for diet orders and if pt can have suppository.  No B

## 2018-03-19 NOTE — CONSULTS
[facility]  Report of Consultation    Daljit Juares Patient Status:  Observation    1975 MRN [id number]   [facility] 3SW-A Attending Adonis House MD   Hosp Day # 0 PCP [facility]       Assessment / Plan:    1) ESRD- on H MG/ML injection 1 mg, 1 mg, Intravenous, Q2H PRN **OR** morphINE sulfate (PF) 4 MG/ML injection 2 mg, 2 mg, Intravenous, Q2H PRN **OR** morphINE sulfate (PF) 4 MG/ML injection 4 mg, 4 mg, Intravenous, Q2H PRN  •  torsemide (DEMADEX) tab 10 mg, 10 mg, Oral, Josephine  3/19/2018  9:48 AM

## 2018-03-19 NOTE — CONSULTS
[facility]  Report of Consultation    Khadra Richardson Patient Status:  Observation    1975 MRN [id number]   [facility] 3SW-A Attending Sudhir Avalos MD   Hosp Day # 0 PCP [facility]     Reason for Consultation:  Pneumoperit in this encounter. The physician obtained a history, independently performed a physical exam, and developed an assessment and plan. The physician performed all medical decision making.     Kristopher Devlin PA-C    History:  Past Medical History:   Diag activity, polydipsia and polyphagia  ENMT:  Negative for ear drainage, hearing loss and nasal drainage  Eyes:  Negative for eye discharge and vision loss  Gastrointestinal:  Pos for abdominal pain, pos for constipation, neg for anorexia, neg for diarrhea, 0501   GLU  101*  88   BUN  37*  37*   CREATSERUM  9.94*  9.87*   GFRAA  7*  7*   GFRNAA  6*  6*   CA  7.8*  7.6*   ALB  2.3*   --    NA  142  144   K  3.0*  3.0*   CL  103  105   CO2  33.0*  30.0   ALKPHO  96   --    AST  12*   --    ALT  14*   --    BILT pelvis. There is moderate left colon and sigmoid colon diverticulosis. No evidence of acute diverticulitis. No bowel wall thickening and noted.   There are few   borderline distended fluid-filled loops of the small bowel left upper quadrant may represent [facility])     Lower abdominal pain      Pneumoperitoneum     Plan:  1. Differential diagnosis includes PD catheter dysfunction, bacterial peritonitis, and perforated viscus. The first is felt to be most likely given lack of white count or fever.  Peritoneal flu

## 2018-03-20 VITALS
HEART RATE: 64 BPM | SYSTOLIC BLOOD PRESSURE: 106 MMHG | WEIGHT: 255.19 LBS | OXYGEN SATURATION: 95 % | HEIGHT: 71.5 IN | DIASTOLIC BLOOD PRESSURE: 74 MMHG | BODY MASS INDEX: 34.94 KG/M2 | RESPIRATION RATE: 20 BRPM | TEMPERATURE: 98 F

## 2018-03-20 LAB
BASOPHILS # BLD AUTO: 0.02 X10(3) UL (ref 0–0.1)
BASOPHILS NFR BLD AUTO: 0.2 %
BUN BLD-MCNC: 34 MG/DL (ref 8–20)
CALCIUM BLD-MCNC: 7.2 MG/DL (ref 8.3–10.3)
CHLORIDE: 102 MMOL/L (ref 101–111)
CO2: 29 MMOL/L (ref 22–32)
CREAT BLD-MCNC: 9.49 MG/DL (ref 0.7–1.3)
EOSINOPHIL # BLD AUTO: 0.37 X10(3) UL (ref 0–0.3)
EOSINOPHIL NFR BLD AUTO: 4.5 %
ERYTHROCYTE [DISTWIDTH] IN BLOOD BY AUTOMATED COUNT: 14 % (ref 11.5–16)
GLUCOSE BLD-MCNC: 88 MG/DL (ref 70–99)
HCT VFR BLD AUTO: 24.1 % (ref 37–53)
HGB BLD-MCNC: 7.9 G/DL (ref 13–17)
HGB BLD-MCNC: 8.3 G/DL (ref 13–17)
IMMATURE GRANULOCYTE COUNT: 0.02 X10(3) UL (ref 0–1)
IMMATURE GRANULOCYTE RATIO %: 0.2 %
LYMPHOCYTES # BLD AUTO: 1.7 X10(3) UL (ref 0.9–4)
LYMPHOCYTES NFR BLD AUTO: 20.5 %
MCH RBC QN AUTO: 29.4 PG (ref 27–33.2)
MCHC RBC AUTO-ENTMCNC: 32.8 G/DL (ref 31–37)
MCV RBC AUTO: 89.6 FL (ref 80–99)
MONOCYTES # BLD AUTO: 0.97 X10(3) UL (ref 0.1–1)
MONOCYTES NFR BLD AUTO: 11.7 %
NEUTROPHIL ABS PRELIM: 5.2 X10 (3) UL (ref 1.3–6.7)
NEUTROPHILS # BLD AUTO: 5.2 X10(3) UL (ref 1.3–6.7)
NEUTROPHILS NFR BLD AUTO: 62.9 %
PLATELET # BLD AUTO: 289 10(3)UL (ref 150–450)
POTASSIUM SERPL-SCNC: 3.2 MMOL/L (ref 3.6–5.1)
RBC # BLD AUTO: 2.69 X10(6)UL (ref 4.3–5.7)
RED CELL DISTRIBUTION WIDTH-SD: 45.3 FL (ref 35.1–46.3)
SODIUM SERPL-SCNC: 141 MMOL/L (ref 136–144)
VANCOMYCIN RANDOM: 22.9 UG/ML
WBC # BLD AUTO: 8.3 X10(3) UL (ref 4–13)

## 2018-03-20 PROCEDURE — 99233 SBSQ HOSP IP/OBS HIGH 50: CPT | Performed by: INTERNAL MEDICINE

## 2018-03-20 PROCEDURE — 99224 SUBSEQUENT OBSERVATION CARE: CPT | Performed by: SURGERY

## 2018-03-20 PROCEDURE — 99239 HOSP IP/OBS DSCHRG MGMT >30: CPT | Performed by: HOSPITALIST

## 2018-03-20 RX ORDER — ONDANSETRON 8 MG/1
8 TABLET, ORALLY DISINTEGRATING ORAL EVERY 4 HOURS PRN
Qty: 6 TABLET | Refills: 0 | Status: SHIPPED | OUTPATIENT
Start: 2018-03-20

## 2018-03-20 RX ORDER — HYDROCODONE BITARTRATE AND ACETAMINOPHEN 5; 325 MG/1; MG/1
1-2 TABLET ORAL EVERY 4 HOURS PRN
Qty: 8 TABLET | Refills: 0 | Status: ON HOLD | OUTPATIENT
Start: 2018-03-20 | End: 2018-03-24

## 2018-03-20 RX ORDER — POTASSIUM CHLORIDE 20 MEQ/1
20 TABLET, EXTENDED RELEASE ORAL ONCE
Status: COMPLETED | OUTPATIENT
Start: 2018-03-20 | End: 2018-03-20

## 2018-03-20 NOTE — PAYOR COMM NOTE
--------------  CONTINUED STAY REVIEW    Payor: [address] [place] [address]  Subscriber #:  [id number]  Authorization Number: N/A    Admit date: 3/20/18  Admit time: 1    Admitting Physician: Teresita Crabtree MD  Attending Physician:  Marylu Messina Dose Route User    3/19/2018 1749 Given 25 mg Oral Marian Aguilar RN      multivitamin (DIALYVITE - RENAL) tab 1 tablet     Date Action Dose Route User    3/20/2018 7847 Given 1 tablet Oral Bina Byrne RN      Gentamicin Sulfate (GARAMYCIN) 270 mg in sod

## 2018-03-20 NOTE — PROGRESS NOTES
All discharge instructions d/w patient at bedside. Patient stating to Rn, his friend Lemuel Gonzalez come to floor for discharge instructions, and is waiting for him in car by [facility] ER entrance.  Patient verbalized understanding of all discharge instructions, paperw

## 2018-03-20 NOTE — PROGRESS NOTES
[facility]  Progress Note    Anuj Roque Patient Status:  Observation    1975 MRN [id number]   [facility] 3SW-A Attending Prasanna Hutson MD   Hosp Day # 0 PCP [facility]     Subjective:  Pt without any abdominal pain.   Peggy Reid

## 2018-03-20 NOTE — PROGRESS NOTES
Patient seen and examined. States mild pain continues throughout abd. Dr. Laura Garcia in to see patient. Scripts given to patient for home Cropsey and home Zofran. Repeat hgb level 8.3. Ok to d/c to home today per Dr. Laura Garcia and Dr. Mauricio Ordonez.  Dr. Mauricio Ordonez notified Rn alessio

## 2018-03-20 NOTE — DISCHARGE SUMMARY
[facility] [place] [facility]  DISCHARGE SUMMARY     Sherry Jackson Patient Status:  Inpatient    1975 MRN [id number]   [facility] 3SW-A Attending Amol Germain MD   Hosp Day # 0 PCP [facility]     Date of Admission: 3/18/2018  Date of Kailey Delgadillo without acute abnormality. Patient was evaluated by general surgery and nephrology. PD fluid was sent for cell count which was consistent with peritonitis and gram stain showed GP organism.  Patient started on Vancomycin with good response in his abdominal drug:  Sevelamer Carbonate      Take 800 mg by mouth 3 (three) times daily with meals.    Refills:  0     SENSIPAR 30 MG Tabs  Generic drug:  Cinacalcet HCl      Take 30 mg by mouth daily with breakfast.   Refills:  0     torsemide 10 MG Tabs  Commonly know

## 2018-03-20 NOTE — PROGRESS NOTES
[facility]  Nephrology Progress Note    Anuj Castañeda Attending:  Rafy Real MD       Assessment and Plan:    1) ESRD- on HD -> now PD x 9 months, doing well and is compliant. Labs / volume OK.  PLAN- continue nightly PD with usual prescriptio 03/20/2018   .0 03/20/2018   CREATSERUM 9.49 03/20/2018   BUN 34 03/20/2018    03/20/2018   K 3.2 03/20/2018    03/20/2018   CO2 29.0 03/20/2018   GLU 88 03/20/2018   CA 7.2 03/20/2018       Imaging: All imaging studies reviewed.     Med

## 2018-03-20 NOTE — PROGRESS NOTES
[facility]  Progress Note     Patricia Kirkland Patient Status:  Inpatient    1975 MRN [id number]   [facility] 3SW-A Attending Syed Russell MD   Hosp Day # 0 PCP [facility]     Chief Complaint: abd pain    S: Patient feel allopurinol  100 mg Oral Daily   • AmLODIPine Besylate  10 mg Oral Daily   • Terazosin HCl  2 mg Oral Daily   • HydrALAZINE HCl  100 mg Oral Q8H [address]   • multivitamin  1 tablet Oral Daily   • torsemide  10 mg Oral Daily   • pantoprazole (PROTONIX) IV push

## 2018-03-22 ENCOUNTER — HOSPITAL ENCOUNTER (OUTPATIENT)
Facility: HOSPITAL | Age: 43
Setting detail: OBSERVATION
Discharge: HOME | End: 2018-03-24
Attending: EMERGENCY MEDICINE | Admitting: HOSPITALIST
Payer: MEDICAID

## 2018-03-22 DIAGNOSIS — R10.31 ABDOMINAL PAIN, RIGHT LOWER QUADRANT: Primary | ICD-10-CM

## 2018-03-22 DIAGNOSIS — T85.71XD PERITONITIS ASSOCIATED WITH PERITONEAL DIALYSIS, SUBSEQUENT ENCOUNTER (HCC): ICD-10-CM

## 2018-03-22 DIAGNOSIS — E87.6 HYPOKALEMIA: ICD-10-CM

## 2018-03-22 DIAGNOSIS — T85.691A PERITONEAL DIALYSIS CATHETER MECHANICAL COMPLICATION, INITIAL ENCOUNTER (HCC): ICD-10-CM

## 2018-03-22 DIAGNOSIS — N17.9 RENAL FAILURE (ARF), ACUTE ON CHRONIC (HCC): ICD-10-CM

## 2018-03-22 DIAGNOSIS — N18.9 RENAL FAILURE (ARF), ACUTE ON CHRONIC (HCC): ICD-10-CM

## 2018-03-22 PROBLEM — D64.9 ANEMIA: Status: ACTIVE | Noted: 2018-03-22

## 2018-03-22 LAB
ALBUMIN SERPL-MCNC: 2 G/DL (ref 3.5–4.8)
ALP LIVER SERPL-CCNC: 82 U/L (ref 45–117)
ALT SERPL-CCNC: 15 U/L (ref 17–63)
AST SERPL-CCNC: 15 U/L (ref 15–41)
BASOPHILS # BLD AUTO: 0.02 X10(3) UL (ref 0–0.1)
BASOPHILS NFR BLD AUTO: 0.3 %
BILIRUB SERPL-MCNC: 0.2 MG/DL (ref 0.1–2)
BUN BLD-MCNC: 45 MG/DL (ref 8–20)
CALCIUM BLD-MCNC: 7.2 MG/DL (ref 8.3–10.3)
CHLORIDE: 105 MMOL/L (ref 101–111)
CO2: 29 MMOL/L (ref 22–32)
CREAT BLD-MCNC: 11.3 MG/DL (ref 0.7–1.3)
EOSINOPHIL # BLD AUTO: 0.27 X10(3) UL (ref 0–0.3)
EOSINOPHIL NFR BLD AUTO: 4.2 %
ERYTHROCYTE [DISTWIDTH] IN BLOOD BY AUTOMATED COUNT: 13.8 % (ref 11.5–16)
GLUCOSE BLD-MCNC: 88 MG/DL (ref 70–99)
HCT VFR BLD AUTO: 26.9 % (ref 37–53)
HGB BLD-MCNC: 8.9 G/DL (ref 13–17)
IMMATURE GRANULOCYTE COUNT: 0.02 X10(3) UL (ref 0–1)
IMMATURE GRANULOCYTE RATIO %: 0.3 %
LYMPHOCYTES # BLD AUTO: 1.57 X10(3) UL (ref 0.9–4)
LYMPHOCYTES NFR BLD AUTO: 24.5 %
M PROTEIN MFR SERPL ELPH: 5.7 G/DL (ref 6.1–8.3)
MCH RBC QN AUTO: 29.4 PG (ref 27–33.2)
MCHC RBC AUTO-ENTMCNC: 33.1 G/DL (ref 31–37)
MCV RBC AUTO: 88.8 FL (ref 80–99)
MONOCYTES # BLD AUTO: 0.73 X10(3) UL (ref 0.1–1)
MONOCYTES NFR BLD AUTO: 11.4 %
NEUTROPHIL ABS PRELIM: 3.8 X10 (3) UL (ref 1.3–6.7)
NEUTROPHILS # BLD AUTO: 3.8 X10(3) UL (ref 1.3–6.7)
NEUTROPHILS NFR BLD AUTO: 59.3 %
PLATELET # BLD AUTO: 397 10(3)UL (ref 150–450)
POTASSIUM SERPL-SCNC: 3.3 MMOL/L (ref 3.6–5.1)
RBC # BLD AUTO: 3.03 X10(6)UL (ref 4.3–5.7)
RED CELL DISTRIBUTION WIDTH-SD: 45.1 FL (ref 35.1–46.3)
SODIUM SERPL-SCNC: 142 MMOL/L (ref 136–144)
WBC # BLD AUTO: 6.4 X10(3) UL (ref 4–13)

## 2018-03-22 RX ORDER — DEXTROSE MONOHYDRATE, SODIUM CHLORIDE, SODIUM LACTATE, CALCIUM CHLORIDE, MAGNESIUM CHLORIDE 2.5; 567; 392; 25.7; 15.2 G/100ML; MG/100ML; MG/100ML; MG/100ML; MG/100ML
SOLUTION INTRAPERITONEAL
Status: DISCONTINUED | OUTPATIENT
Start: 2018-03-23 | End: 2018-03-24

## 2018-03-22 RX ORDER — POTASSIUM CHLORIDE 20 MEQ/1
40 TABLET, EXTENDED RELEASE ORAL ONCE
Status: COMPLETED | OUTPATIENT
Start: 2018-03-22 | End: 2018-03-23

## 2018-03-22 RX ORDER — MORPHINE SULFATE 4 MG/ML
4 INJECTION, SOLUTION INTRAMUSCULAR; INTRAVENOUS ONCE
Status: COMPLETED | OUTPATIENT
Start: 2018-03-22 | End: 2018-03-22

## 2018-03-23 ENCOUNTER — ANESTHESIA EVENT (OUTPATIENT)
Dept: SURGERY | Facility: HOSPITAL | Age: 43
End: 2018-03-23
Payer: MEDICAID

## 2018-03-23 ENCOUNTER — SURGERY (OUTPATIENT)
Age: 43
End: 2018-03-23

## 2018-03-23 ENCOUNTER — APPOINTMENT (OUTPATIENT)
Dept: INTERVENTIONAL RADIOLOGY/VASCULAR | Facility: HOSPITAL | Age: 43
End: 2018-03-23
Attending: INTERNAL MEDICINE
Payer: MEDICAID

## 2018-03-23 ENCOUNTER — ANESTHESIA (OUTPATIENT)
Dept: SURGERY | Facility: HOSPITAL | Age: 43
End: 2018-03-23
Payer: MEDICAID

## 2018-03-23 PROBLEM — D63.1 ANEMIA IN ESRD (END-STAGE RENAL DISEASE) (HCC): Status: ACTIVE | Noted: 2018-03-22

## 2018-03-23 PROBLEM — D63.1 ANEMIA IN ESRD (END-STAGE RENAL DISEASE): Status: ACTIVE | Noted: 2018-03-22

## 2018-03-23 PROBLEM — N18.6 ANEMIA IN ESRD (END-STAGE RENAL DISEASE) (HCC): Status: ACTIVE | Noted: 2018-03-22

## 2018-03-23 PROBLEM — D63.1 ANEMIA IN ESRD (END-STAGE RENAL DISEASE)  (HCC): Status: ACTIVE | Noted: 2018-03-22

## 2018-03-23 PROBLEM — N18.6 ANEMIA IN ESRD (END-STAGE RENAL DISEASE): Status: ACTIVE | Noted: 2018-03-22

## 2018-03-23 PROBLEM — N18.6 ANEMIA IN ESRD (END-STAGE RENAL DISEASE)  (HCC): Status: ACTIVE | Noted: 2018-03-22

## 2018-03-23 LAB
ATRIAL RATE: 67 BPM
INR BLD: 1.07 (ref 0.9–1.1)
P AXIS: 5 DEGREES
P-R INTERVAL: 184 MS
PSA SERPL DL<=0.01 NG/ML-MCNC: 14.3 SECONDS (ref 12.4–14.7)
Q-T INTERVAL: 424 MS
QRS DURATION: 98 MS
QTC CALCULATION (BEZET): 448 MS
R AXIS: -46 DEGREES
T AXIS: -16 DEGREES
VENTRICULAR RATE: 67 BPM

## 2018-03-23 PROCEDURE — 99243 OFF/OP CNSLTJ NEW/EST LOW 30: CPT | Performed by: SURGERY

## 2018-03-23 PROCEDURE — 99244 OFF/OP CNSLTJ NEW/EST MOD 40: CPT | Performed by: INTERNAL MEDICINE

## 2018-03-23 PROCEDURE — 0W9G4ZX DRAINAGE OF PERITONEAL CAVITY, PERCUTANEOUS ENDOSCOPIC APPROACH, DIAGNOSTIC: ICD-10-PCS | Performed by: SURGERY

## 2018-03-23 PROCEDURE — 99220 INITIAL OBSERVATION CARE,LEVL III: CPT | Performed by: HOSPITALIST

## 2018-03-23 PROCEDURE — 0JH63WZ INSERTION OF TOTALLY IMPLANTABLE VASCULAR ACCESS DEVICE INTO CHEST SUBCUTANEOUS TISSUE AND FASCIA, PERCUTANEOUS APPROACH: ICD-10-PCS | Performed by: RADIOLOGY

## 2018-03-23 PROCEDURE — 0JWT33Z REVISION OF INFUSION DEVICE IN TRUNK SUBCUTANEOUS TISSUE AND FASCIA, PERCUTANEOUS APPROACH: ICD-10-PCS | Performed by: SURGERY

## 2018-03-23 PROCEDURE — B5191ZA FLUOROSCOPY OF INFERIOR VENA CAVA USING LOW OSMOLAR CONTRAST, GUIDANCE: ICD-10-PCS | Performed by: RADIOLOGY

## 2018-03-23 PROCEDURE — 49325 LAP REVISION PERM IP CATH: CPT | Performed by: SURGERY

## 2018-03-23 PROCEDURE — 06H033Z INSERTION OF INFUSION DEVICE INTO INFERIOR VENA CAVA, PERCUTANEOUS APPROACH: ICD-10-PCS | Performed by: RADIOLOGY

## 2018-03-23 RX ORDER — NALOXONE HYDROCHLORIDE 0.4 MG/ML
80 INJECTION, SOLUTION INTRAMUSCULAR; INTRAVENOUS; SUBCUTANEOUS AS NEEDED
Status: DISCONTINUED | OUTPATIENT
Start: 2018-03-23 | End: 2018-03-23 | Stop reason: HOSPADM

## 2018-03-23 RX ORDER — HYDROCODONE BITARTRATE AND ACETAMINOPHEN 5; 325 MG/1; MG/1
2 TABLET ORAL AS NEEDED
Status: DISCONTINUED | OUTPATIENT
Start: 2018-03-23 | End: 2018-03-23 | Stop reason: HOSPADM

## 2018-03-23 RX ORDER — MORPHINE SULFATE 4 MG/ML
2 INJECTION, SOLUTION INTRAMUSCULAR; INTRAVENOUS EVERY 5 MIN PRN
Status: DISCONTINUED | OUTPATIENT
Start: 2018-03-23 | End: 2018-03-23 | Stop reason: HOSPADM

## 2018-03-23 RX ORDER — AMLODIPINE BESYLATE 5 MG/1
10 TABLET ORAL DAILY
Status: DISCONTINUED | OUTPATIENT
Start: 2018-03-23 | End: 2018-03-24

## 2018-03-23 RX ORDER — ONDANSETRON 2 MG/ML
4 INJECTION INTRAMUSCULAR; INTRAVENOUS EVERY 6 HOURS PRN
Status: DISCONTINUED | OUTPATIENT
Start: 2018-03-23 | End: 2018-03-24

## 2018-03-23 RX ORDER — BUPIVACAINE HYDROCHLORIDE AND EPINEPHRINE 5; 5 MG/ML; UG/ML
INJECTION, SOLUTION EPIDURAL; INTRACAUDAL; PERINEURAL AS NEEDED
Status: DISCONTINUED | OUTPATIENT
Start: 2018-03-23 | End: 2018-03-23 | Stop reason: HOSPADM

## 2018-03-23 RX ORDER — MORPHINE SULFATE 4 MG/ML
4 INJECTION, SOLUTION INTRAMUSCULAR; INTRAVENOUS EVERY 2 HOUR PRN
Status: DISCONTINUED | OUTPATIENT
Start: 2018-03-23 | End: 2018-03-24

## 2018-03-23 RX ORDER — ONDANSETRON 2 MG/ML
4 INJECTION INTRAMUSCULAR; INTRAVENOUS AS NEEDED
Status: DISCONTINUED | OUTPATIENT
Start: 2018-03-23 | End: 2018-03-23 | Stop reason: HOSPADM

## 2018-03-23 RX ORDER — CINACALCET 30 MG/1
30 TABLET, FILM COATED ORAL
Status: DISCONTINUED | OUTPATIENT
Start: 2018-03-23 | End: 2018-03-24

## 2018-03-23 RX ORDER — MORPHINE SULFATE 4 MG/ML
1 INJECTION, SOLUTION INTRAMUSCULAR; INTRAVENOUS EVERY 2 HOUR PRN
Status: DISCONTINUED | OUTPATIENT
Start: 2018-03-23 | End: 2018-03-24

## 2018-03-23 RX ORDER — SODIUM CHLORIDE, SODIUM LACTATE, POTASSIUM CHLORIDE, CALCIUM CHLORIDE 600; 310; 30; 20 MG/100ML; MG/100ML; MG/100ML; MG/100ML
INJECTION, SOLUTION INTRAVENOUS CONTINUOUS
Status: DISCONTINUED | OUTPATIENT
Start: 2018-03-23 | End: 2018-03-23 | Stop reason: HOSPADM

## 2018-03-23 RX ORDER — TORSEMIDE 5 MG/1
5 TABLET ORAL DAILY
Status: DISCONTINUED | OUTPATIENT
Start: 2018-03-23 | End: 2018-03-24

## 2018-03-23 RX ORDER — TRAZODONE HYDROCHLORIDE 50 MG/1
50 TABLET ORAL NIGHTLY PRN
Status: DISCONTINUED | OUTPATIENT
Start: 2018-03-23 | End: 2018-03-24

## 2018-03-23 RX ORDER — LIDOCAINE HYDROCHLORIDE 10 MG/ML
INJECTION, SOLUTION INFILTRATION; PERINEURAL
Status: COMPLETED
Start: 2018-03-23 | End: 2018-03-23

## 2018-03-23 RX ORDER — HEPARIN SODIUM 5000 [USP'U]/ML
INJECTION, SOLUTION INTRAVENOUS; SUBCUTANEOUS
Status: COMPLETED
Start: 2018-03-23 | End: 2018-03-23

## 2018-03-23 RX ORDER — MIDAZOLAM HYDROCHLORIDE 1 MG/ML
INJECTION INTRAMUSCULAR; INTRAVENOUS
Status: COMPLETED
Start: 2018-03-23 | End: 2018-03-23

## 2018-03-23 RX ORDER — HEPARIN SODIUM 5000 [USP'U]/ML
5000 INJECTION, SOLUTION INTRAVENOUS; SUBCUTANEOUS EVERY 8 HOURS SCHEDULED
Status: DISCONTINUED | OUTPATIENT
Start: 2018-03-23 | End: 2018-03-24

## 2018-03-23 RX ORDER — DOCUSATE SODIUM 100 MG/1
100 CAPSULE, LIQUID FILLED ORAL 2 TIMES DAILY
Status: DISCONTINUED | OUTPATIENT
Start: 2018-03-23 | End: 2018-03-24

## 2018-03-23 RX ORDER — TERAZOSIN 2 MG/1
2 CAPSULE ORAL NIGHTLY
Status: DISCONTINUED | OUTPATIENT
Start: 2018-03-23 | End: 2018-03-24

## 2018-03-23 RX ORDER — HYDROCODONE BITARTRATE AND ACETAMINOPHEN 5; 325 MG/1; MG/1
1 TABLET ORAL AS NEEDED
Status: DISCONTINUED | OUTPATIENT
Start: 2018-03-23 | End: 2018-03-23 | Stop reason: HOSPADM

## 2018-03-23 RX ORDER — BISACODYL 10 MG
10 SUPPOSITORY, RECTAL RECTAL
Status: DISCONTINUED | OUTPATIENT
Start: 2018-03-23 | End: 2018-03-24

## 2018-03-23 RX ORDER — SENNOSIDES 8.6 MG
8.6 TABLET ORAL 2 TIMES DAILY
Status: DISCONTINUED | OUTPATIENT
Start: 2018-03-23 | End: 2018-03-24

## 2018-03-23 RX ORDER — HEPARIN SODIUM 1000 [USP'U]/ML
1.5 INJECTION, SOLUTION INTRAVENOUS; SUBCUTANEOUS ONCE
Status: DISCONTINUED | OUTPATIENT
Start: 2018-03-23 | End: 2018-03-24

## 2018-03-23 RX ORDER — CEFAZOLIN SODIUM 1 G/3ML
INJECTION, POWDER, FOR SOLUTION INTRAMUSCULAR; INTRAVENOUS
Status: COMPLETED
Start: 2018-03-23 | End: 2018-03-23

## 2018-03-23 RX ORDER — CEFAZOLIN SODIUM 1 G/3ML
INJECTION, POWDER, FOR SOLUTION INTRAMUSCULAR; INTRAVENOUS
Status: DISCONTINUED | OUTPATIENT
Start: 2018-03-23 | End: 2018-03-23 | Stop reason: HOSPADM

## 2018-03-23 RX ORDER — HYDRALAZINE HYDROCHLORIDE 50 MG/1
100 TABLET, FILM COATED ORAL 3 TIMES DAILY
Status: DISCONTINUED | OUTPATIENT
Start: 2018-03-23 | End: 2018-03-24

## 2018-03-23 RX ORDER — POLYETHYLENE GLYCOL 3350 17 G/17G
17 POWDER, FOR SOLUTION ORAL DAILY PRN
Status: DISCONTINUED | OUTPATIENT
Start: 2018-03-23 | End: 2018-03-24

## 2018-03-23 RX ORDER — SEVELAMER HYDROCHLORIDE 400 MG/1
800 TABLET, FILM COATED ORAL
Status: DISCONTINUED | OUTPATIENT
Start: 2018-03-23 | End: 2018-03-24

## 2018-03-23 RX ORDER — MORPHINE SULFATE 4 MG/ML
2 INJECTION, SOLUTION INTRAMUSCULAR; INTRAVENOUS EVERY 2 HOUR PRN
Status: DISCONTINUED | OUTPATIENT
Start: 2018-03-23 | End: 2018-03-24

## 2018-03-23 RX ORDER — LIDOCAINE HYDROCHLORIDE AND EPINEPHRINE 15; 5 MG/ML; UG/ML
INJECTION, SOLUTION EPIDURAL
Status: COMPLETED
Start: 2018-03-23 | End: 2018-03-23

## 2018-03-23 RX ORDER — METOCLOPRAMIDE HYDROCHLORIDE 5 MG/ML
10 INJECTION INTRAMUSCULAR; INTRAVENOUS AS NEEDED
Status: DISCONTINUED | OUTPATIENT
Start: 2018-03-23 | End: 2018-03-23 | Stop reason: HOSPADM

## 2018-03-23 NOTE — PROGRESS NOTES
Patient seen and examined    S- no complaints    General- NAD  Chest- CTAB  CVS- RRR  Abdo- soft, NT, BS+    Plan  General Surgery consult  Sunday Acevedo M.D.   [facility]

## 2018-03-23 NOTE — ED INITIAL ASSESSMENT (HPI)
Pt reports he does his own retroperitoneal dialysis at home. Was recently admitted for abdominal pain. Discharged on 3/20/18. States he got a new dialysis machine today and machine read \"low drainage\". Pt in need of dialysis.

## 2018-03-23 NOTE — CM/SW NOTE
Spoke with Concha from [address] who confirmed they will open a 6:30 am chair time T/T/S, but only on a temporary basis for 2 weeks. She has informed pt of this and he is in agreement. SW updated Dr Reyna Clemons. No further needs at this time.

## 2018-03-23 NOTE — CM/SW NOTE
Received MD order to arrange for outpt HD at [address] [place]. Spoke with Concha at [address] ([phone]) regarding plan for temporary outpt HD x 2 weeks.   She stated that no clinical information is required for this as pt would just be a transfer w

## 2018-03-23 NOTE — CM/SW NOTE
03/23/18 1200   CM/SW Screening   Referral Source Social Work (self-referral)   Information Source Chart review;Nursing rounds   Patient's Mental Status Alert;Oriented   Patient lives with Spouse  (family)   Patient Status Prior to Admission   Independe

## 2018-03-23 NOTE — H&P
[facility] HOSPITALIST  History and Physical     Demetres Gayla Espinoza Patient Status:  Observation    1975 MRN [id number]   [facility] 5NW-A Attending Benito Gonzalez MD   Hosp Day # 0 PCP [facility]     Chief Complaint: PD dysfunction breakfast. Disp:  Rfl:    Cholecalciferol (VITAMIN D3) 02070 units Oral Cap Take 50,000 Units by mouth once a week. Disp:  Rfl:    HydrALAZINE HCl (APRESOLINE) 100 MG Oral Tab Take 100 mg by mouth 3 (three) times daily.    Disp:  Rfl:    AmLODIPine Besyla 03/20/18   0505  03/22/18 2028   GLU  88  88   BUN  34*  45*   CREATSERUM  9.49*  11.30*   GFRAA  7*  6*   GFRNAA  6*  5*   CA  7.2*  7.2*   ALB   --   2.0*   NA  141  142   K  3.2*  3.3*   CL  102  105   CO2  29.0  29.0   ALKPHO   --   82   AST   --   1

## 2018-03-23 NOTE — ED PROVIDER NOTES
Patient Seen in: [facility] Emergency Department    History   Patient presents with:  Fatigue (constitutional, neurologic)  Dyspnea TORO SOB (respiratory)    Stated Complaint: needs dialysis    HPI    Home peritoneal dialysis machine still not workin Physical Exam    Pleasant -American male lying on an emergency department bed he is in no acute distress he is watching television. His HEENT exam is within normal limits other than some mildly pale conjunctivae. His mouth is dry.   His [facility] sinus rhythm T-wave flattening in the lateral leads Q waves inferiorly           ED Course as of Mar 23 0419  ------------------------------------------------------------       [facility]     Patient reports that he came to the emergency department basically to b complication, initial encounter [facility]) T85.691A 3/22/2018     Peritonitis associated with peritoneal dialysis, subsequent encounter [facility]) T85.71XD 3/22/2018

## 2018-03-23 NOTE — CONSULTS
[facility]  Report of Consultation    Jennifer Cherry Patient Status:  Observation    1975 MRN [id number]   [facility] 5NW-A Attending Winsome Yanez MD   Hosp Day # 0 PCP [facility]     Reason for Consultation:  ESRD on PD/p Intravenous, Q2H PRN **OR** morphINE sulfate (PF) 4 MG/ML injection 2 mg, 2 mg, Intravenous, Q2H PRN **OR** morphINE sulfate (PF) 4 MG/ML injection 4 mg, 4 mg, Intravenous, Q2H PRN  •  TraZODone HCl (DESYREL) tab 50 mg, 50 mg, Oral, Nightly PRN  •  docusat intact, moving all extremities  Skin: Warm and dry, no rashes      Laboratory Data:    Lab Results  Component Value Date   WBC 6.4 03/22/2018   HGB 8.9 03/22/2018   HCT 26.9 03/22/2018   .0 03/22/2018   CREATSERUM 11.30 03/22/2018   BUN 45 03/22/201 spoke with IR to place PC today    Jetta Nyhan, MD  3/23/2018  12:53 PM

## 2018-03-23 NOTE — PLAN OF CARE
CARDIOVASCULAR - ADULT    • Maintains optimal cardiac output and hemodynamic stability Progressing    • Absence of cardiac arrhythmias or at baseline Progressing        GASTROINTESTINAL - ADULT    • Minimal or absence of nausea and vomiting Progressing correctly. I called the help number on the PD machine multiple times as well as the [facility] help number to speak with someone to troubleshoot. I spoke with Doris and Laura Shell who helped assist me.  Doris said she would be sending a tech over before 0

## 2018-03-23 NOTE — PROGRESS NOTES
03/23/18 1734   Clinical Encounter Type   Visited With Patient not available   Continue Visiting Yes

## 2018-03-23 NOTE — CONSULTS
[facility]  History & Physical    Demetres Akilah Roque Patient Status:  Observation    1975 MRN [id number]   [facility] 5NW-A Attending Ephraim Rios MD   Hosp Day # 0 PCP [facility]     Date of Admission:  3/22/2018    History o Medications:    Prescriptions Prior to Admission:  ondansetron 8 MG Oral Tablet Dispersible Take 1 tablet (8 mg total) by mouth every 4 (four) hours as needed for Nausea.  Disp: 6 tablet Rfl: 0 Past Week at Unknown time   HYDROcodone-acetaminophen 5-325 MG decreased appetite, diarrhea and vomiting  Genitourinary:  Negative for dysuria and hematuria  Hema/Lymph:  Negative for easy bleeding and easy bruising  Integumentary:  Negative for pruritus and rash  Musculoskeletal:  Negative for bone/joint symptoms  Ne and without gangrene     Knee effusion, left     Acute pain of left knee     Patella, chondromalacia, left     ESRD (end stage renal disease) (HCC)     Uremia     Non-intractable vomiting with nausea     Nausea and vomiting     Dehydration     Pneumoperito was discharged home on vancomycin. The patient states that he has had intermittent difficulty with his peritoneal dialysis catheter since it was placed. Currently he is having difficulty with the outflow.   Abdomen-soft-distended-mildly tender to deep pal

## 2018-03-23 NOTE — PAYOR COMM NOTE
--------------  ADMISSION REVIEW     Payor: Tyree Shipley #:  [id number]  Authorization Number: N/A    Admit date: 03/22/18      Admitting Physician: Benito Gonzalez MD  Attending Physician:  Moe Munoz MD  [facility] ED Triage Vitals [03/22/18 2020]  BP: (!) 148/108  Pulse: 65  Resp: 20  Temp: 98.1 °F (36.7 °C)  Temp src: Temporal  SpO2: 100 %  O2 Device: None (Room air)    Current:BP (!) 162/103   Pulse 71   Temp 98.3 °F (36.8 °C) (Oral)   Resp 18   Ht 180.3 cm (5' order to dialyze himself. I discussed the case with Dr. Janae Newman. Patient was unable to call his dialysis nurse despite multiple attempts she did not answer the phone.   He is still having abdominal pain was noted to have recent bacterial peritonitis is on missing dialysis. No fevers, chills, nausea or vomiting. Past Surgical History: Past Surgical History:  06/2017: DIALYSIS ACCESS SYSTEM Right  1983: OTHER      Comment: wrist fx    Social History:  reports that he has never smoked.  He has never use (36.7 °C) (Temporal)   Resp 18   Ht 5' 11\" (1.803 m)   Wt 240 lb (108.9 kg)   SpO2 100%   BMI 33.47 kg/m²    General: No acute distress. Alert and oriented x 3. HEENT: Normocephalic atraumatic. Moist mucous membranes. EOM-I. PERRLA. Anicteric.   Neck: No 5. Hypokalemia       Quality:  · DVT Prophylaxis: heparin   · CODE status: full  · Phillips: none    Plan of care discussed with patient, rn     Ami Sampson MD  3/23/2018    GENERAL SURGERY CONSULT  History of Present Illness:  Anuj Dickerson is a(n) Intravenous Mariana Mercado, RN      Potassium Chloride ER (K-DUR M20) CR tab 40 mEq     Date Action Dose Route User    3/23/2018 0056 Given 40 mEq Oral Renee Paula, RN      [facility]) tab TABS 8.6 mg     Date Action Dose Route User    3/23/2018

## 2018-03-23 NOTE — PROGRESS NOTES
Patient admitted a/o X4 from home with family. Reports having RLQ abdominal pain. Reports that he gives himself peritoneal dialysis each night and has not gotten treatments for the past 2 days. Came to ED.  Spoke with Dr. Anna Frausto of nephrology--plans to rec

## 2018-03-24 VITALS
WEIGHT: 266 LBS | HEART RATE: 75 BPM | SYSTOLIC BLOOD PRESSURE: 139 MMHG | RESPIRATION RATE: 16 BRPM | OXYGEN SATURATION: 97 % | TEMPERATURE: 99 F | BODY MASS INDEX: 37.24 KG/M2 | HEIGHT: 71 IN | DIASTOLIC BLOOD PRESSURE: 87 MMHG

## 2018-03-24 LAB
BASOPHILS # BLD AUTO: 0.03 X10(3) UL (ref 0–0.1)
BASOPHILS NFR BLD AUTO: 0.4 %
EOSINOPHIL # BLD AUTO: 0.1 X10(3) UL (ref 0–0.3)
EOSINOPHIL NFR BLD AUTO: 1.2 %
ERYTHROCYTE [DISTWIDTH] IN BLOOD BY AUTOMATED COUNT: 13.9 % (ref 11.5–16)
HAV IGM SER QL: NONREACTIVE
HBV CORE IGM SER QL: NONREACTIVE
HBV SURFACE AG SERPL QL IA: NONREACTIVE
HCT VFR BLD AUTO: 28.4 % (ref 37–53)
HEPATITIS C VIRUS AB INTERPRETATION: NONREACTIVE
HGB BLD-MCNC: 9 G/DL (ref 13–17)
IMMATURE GRANULOCYTE COUNT: 0.05 X10(3) UL (ref 0–1)
IMMATURE GRANULOCYTE RATIO %: 0.6 %
LYMPHOCYTES # BLD AUTO: 1.13 X10(3) UL (ref 0.9–4)
LYMPHOCYTES NFR BLD AUTO: 13.5 %
MCH RBC QN AUTO: 28.3 PG (ref 27–33.2)
MCHC RBC AUTO-ENTMCNC: 31.7 G/DL (ref 31–37)
MCV RBC AUTO: 89.3 FL (ref 80–99)
MONOCYTES # BLD AUTO: 0.96 X10(3) UL (ref 0.1–1)
MONOCYTES NFR BLD AUTO: 11.5 %
NEUTROPHIL ABS PRELIM: 6.09 X10 (3) UL (ref 1.3–6.7)
NEUTROPHILS # BLD AUTO: 6.09 X10(3) UL (ref 1.3–6.7)
NEUTROPHILS NFR BLD AUTO: 72.8 %
PLATELET # BLD AUTO: 325 10(3)UL (ref 150–450)
RBC # BLD AUTO: 3.18 X10(6)UL (ref 4.3–5.7)
RED CELL DISTRIBUTION WIDTH-SD: 46 FL (ref 35.1–46.3)
VANCOMYCIN RANDOM: 14.3 UG/ML
WBC # BLD AUTO: 8.4 X10(3) UL (ref 4–13)

## 2018-03-24 PROCEDURE — 99217 OBSERVATION CARE DISCHARGE: CPT | Performed by: HOSPITALIST

## 2018-03-24 PROCEDURE — 99213 OFFICE O/P EST LOW 20 MIN: CPT | Performed by: INTERNAL MEDICINE

## 2018-03-24 RX ORDER — LIDOCAINE HYDROCHLORIDE AND EPINEPHRINE 10; 10 MG/ML; UG/ML
20 INJECTION, SOLUTION INFILTRATION; PERINEURAL ONCE
Status: DISCONTINUED | OUTPATIENT
Start: 2018-03-24 | End: 2018-03-24

## 2018-03-24 RX ORDER — HYDROCODONE BITARTRATE AND ACETAMINOPHEN 5; 325 MG/1; MG/1
1-2 TABLET ORAL EVERY 4 HOURS PRN
Qty: 10 TABLET | Refills: 0 | Status: SHIPPED | OUTPATIENT
Start: 2018-03-24

## 2018-03-24 RX ORDER — HEPARIN SODIUM 1000 [USP'U]/ML
1500 INJECTION, SOLUTION INTRAVENOUS; SUBCUTANEOUS
Status: DISCONTINUED | OUTPATIENT
Start: 2018-03-24 | End: 2018-03-24

## 2018-03-24 RX ORDER — HYDROCODONE BITARTRATE AND ACETAMINOPHEN 5; 325 MG/1; MG/1
1-2 TABLET ORAL EVERY 6 HOURS PRN
Status: DISCONTINUED | OUTPATIENT
Start: 2018-03-24 | End: 2018-03-24

## 2018-03-24 NOTE — PROGRESS NOTES
NURSING DISCHARGE NOTE    Discharged Home via Wheelchair. Accompanied by Family member and Support staff  Belongings Taken by patient/family. Discharge education completed. Disease information handouts provided to and reviewed with patient.  Kiley Meléndez

## 2018-03-24 NOTE — BRIEF OP NOTE
Pre-Operative Diagnosis: Nonfunctioning peritoneal dialysis catheter     Post-Operative Diagnosis: Nonfunctioning peritoneal dialysis catheter due to intra-abdominal adhesions     Procedure Performed:   Procedure(s):  DIAGNOSTIC LAPAROSCOPY, REPOSITIONING

## 2018-03-24 NOTE — PROGRESS NOTES
[facility]  Progress Note    Anuj Ericaravinder Guy Patient Status:  Observation    1975 MRN [id number]   [facility] 5NW-A Attending Patricio Kumar MD   Hosp Day # 0 PCP [facility]     Subjective:  Patient with some residual discomf

## 2018-03-24 NOTE — OPERATIVE REPORT
[facility]  Operative Note    Mace Rayo Location: OR   CSN [id number] MRN [id number]   Admission Date 3/22/2018 Operation Date 3/23/2018   Attending Physician Jamal Chung MD Operating Physician Barry Killian MD     Date of procedure:    Kathrine Irwin repeat cultures will be taken at the time of surgery.     Discussed with patient:  The potential risks and benefits of the procedure were discussed in detail with the patient including but not limited to bleeding, infection, seroma/ hematoma formation, post into the left abdomen. Irrigation fluid was instilled into the pelvis. The PD catheter was then uncapped and there was free flow of the irrigation fluid through the catheter. The catheter was then recapped.       The 5 mm port was removed under direct vi

## 2018-03-24 NOTE — PROGRESS NOTES
Patient seen and examined    S- no complaints  General- NAD  Chest- CTAB  CVS- RRR  Abdo- soft, NT, dressing +    Plan  Dc home  Script for 10 tabs of norco printed and signed  HD planned for Tuesday    SHAWNA Colunga

## 2018-03-24 NOTE — ANESTHESIA POSTPROCEDURE EVALUATION
[address] [facility] Patient Status:  Observation   Age/Gender 39year old male MRN [id number]   [facility] SURGERY Attending Gaurav Broussard MD   Hosp Day # 0 PCP [facility]       Anesthesia Post-op Note    Procedure(s):  L

## 2018-03-24 NOTE — PROGRESS NOTES
[facility]  Nephrology Progress Note    Mace Rayo Patient Status:  Observation    1975 MRN [id number]   [facility] 5NW-A Attending Jamal Chung MD   Hosp Day # 0 PCP [facility]       SUBJECTIVE:  Notes reviewed, kaykay (Porcine) 1000 UNIT/ML injection 1,500 Units 1,500 Units Intravenous PRN Dialysis   lidocaine-EPINEPHrine 1 %-1:240869 injection 20 mL 20 mL Subcutaneous Once   AmLODIPine Besylate (NORVASC) tab 10 mg 10 mg Oral Daily   Cinacalcet HCl (SENSIPAR) tab 30 mg AM

## 2018-03-27 NOTE — CM/SW NOTE
Patient discharged on 3/24/18 with no further needs identified.        03/27/18 0800   Discharge disposition   Discharged to: Home or Self   Name of [id number] [facility]   Outpatient services Dialysis   Discharge transportation Private car

## 2018-03-28 NOTE — DISCHARGE SUMMARY
[facility] HOSPITALIST  DISCHARGE SUMMARY     Anuj Peralta Patient Status:  Observation    1975 MRN [id number]   [facility] 5NW-A Attending No att. providers found   [facility] Day # 0 [facility] Tyree Artemiah     Date of Admission: 3/22/2018  Date catheter placed. HE was also on treatment for previously diagnosed staph peritonitis. He had a Permacth placed to plan to rest his abdomen for 2 weeks prior to restarting PD again.   He remained stable and was discharged home in good condition    Procedur Get Your Medications      Please  your prescriptions at the location directed by your doctor or nurse    Bring a paper prescription for each of these medications  · HYDROcodone-acetaminophen 5-325 MG Tabs         [place]    Follow-up simran

## 2019-09-05 ENCOUNTER — ANESTHESIA EVENT (OUTPATIENT)
Dept: SURGERY | Facility: HOSPITAL | Age: 44
End: 2019-09-05

## 2019-09-05 ENCOUNTER — ANESTHESIA (OUTPATIENT)
Dept: SURGERY | Facility: HOSPITAL | Age: 44
End: 2019-09-05

## 2019-09-05 ENCOUNTER — HOSPITAL ENCOUNTER (OUTPATIENT)
Facility: HOSPITAL | Age: 44
Setting detail: OBSERVATION
Discharge: HOME | End: 2019-09-07
Attending: EMERGENCY MEDICINE | Admitting: HOSPITALIST
Payer: COMMERCIAL

## 2019-09-05 ENCOUNTER — APPOINTMENT (OUTPATIENT)
Dept: CT IMAGING | Facility: HOSPITAL | Age: 44
End: 2019-09-05
Attending: NURSE PRACTITIONER
Payer: COMMERCIAL

## 2019-09-05 DIAGNOSIS — K35.80 ACUTE APPENDICITIS: ICD-10-CM

## 2019-09-05 DIAGNOSIS — K35.890 OTHER ACUTE APPENDICITIS: Primary | ICD-10-CM

## 2019-09-05 LAB
ALBUMIN SERPL-MCNC: 3.2 G/DL (ref 3.4–5)
ALBUMIN/GLOB SERPL: 1 {RATIO} (ref 1–2)
ALP LIVER SERPL-CCNC: 82 U/L (ref 45–117)
ALT SERPL-CCNC: 13 U/L (ref 16–61)
ANION GAP SERPL CALC-SCNC: 13 MMOL/L (ref 0–18)
AST SERPL-CCNC: 13 U/L (ref 15–37)
ATRIAL RATE: 72 BPM
BASOPHILS # BLD AUTO: 0.01 X10(3) UL (ref 0–0.2)
BASOPHILS NFR BLD AUTO: 0.1 %
BILIRUB SERPL-MCNC: 0.3 MG/DL (ref 0.1–2)
BILIRUB UR QL STRIP.AUTO: NEGATIVE
BUN BLD-MCNC: 66 MG/DL (ref 7–18)
BUN/CREAT SERPL: 2.9 (ref 10–20)
CALCIUM BLD-MCNC: 9.2 MG/DL (ref 8.5–10.1)
CHLORIDE SERPL-SCNC: 102 MMOL/L (ref 98–112)
CLARITY UR REFRACT.AUTO: CLEAR
CO2 SERPL-SCNC: 23 MMOL/L (ref 21–32)
COLOR UR AUTO: YELLOW
CREAT BLD-MCNC: 23.1 MG/DL (ref 0.7–1.3)
DEPRECATED RDW RBC AUTO: 44.1 FL (ref 35.1–46.3)
EOSINOPHIL # BLD AUTO: 0.01 X10(3) UL (ref 0–0.7)
EOSINOPHIL NFR BLD AUTO: 0.1 %
ERYTHROCYTE [DISTWIDTH] IN BLOOD BY AUTOMATED COUNT: 12.6 % (ref 11–15)
GLOBULIN PLAS-MCNC: 3.3 G/DL (ref 2.8–4.4)
GLUCOSE BLD-MCNC: 94 MG/DL (ref 70–99)
GLUCOSE UR STRIP.AUTO-MCNC: NEGATIVE MG/DL
HCT VFR BLD AUTO: 26.3 % (ref 39–53)
HGB BLD-MCNC: 8.6 G/DL (ref 13–17.5)
IMM GRANULOCYTES # BLD AUTO: 0.13 X10(3) UL (ref 0–1)
IMM GRANULOCYTES NFR BLD: 0.8 %
KETONES UR STRIP.AUTO-MCNC: NEGATIVE MG/DL
LIPASE SERPL-CCNC: 231 U/L (ref 73–393)
LYMPHOCYTES # BLD AUTO: 1.1 X10(3) UL (ref 1–4)
LYMPHOCYTES NFR BLD AUTO: 6.9 %
M PROTEIN MFR SERPL ELPH: 6.5 G/DL (ref 6.4–8.2)
MCH RBC QN AUTO: 31 PG (ref 26–34)
MCHC RBC AUTO-ENTMCNC: 32.7 G/DL (ref 31–37)
MCV RBC AUTO: 94.9 FL (ref 80–100)
MONOCYTES # BLD AUTO: 1.53 X10(3) UL (ref 0.1–1)
MONOCYTES NFR BLD AUTO: 9.6 %
NEUTROPHILS # BLD AUTO: 13.09 X10 (3) UL (ref 1.5–7.7)
NEUTROPHILS # BLD AUTO: 13.09 X10(3) UL (ref 1.5–7.7)
NEUTROPHILS NFR BLD AUTO: 82.5 %
NITRITE UR QL STRIP.AUTO: NEGATIVE
OSMOLALITY SERPL CALC.SUM OF ELEC: 305 MOSM/KG (ref 275–295)
P AXIS: 28 DEGREES
P-R INTERVAL: 180 MS
PH UR STRIP.AUTO: 6 [PH] (ref 4.5–8)
PLATELET # BLD AUTO: 318 10(3)UL (ref 150–450)
POTASSIUM SERPL-SCNC: 3.7 MMOL/L (ref 3.5–5.1)
PROT UR STRIP.AUTO-MCNC: 100 MG/DL
Q-T INTERVAL: 396 MS
QRS DURATION: 104 MS
QTC CALCULATION (BEZET): 433 MS
R AXIS: -49 DEGREES
RBC # BLD AUTO: 2.77 X10(6)UL (ref 4.3–5.7)
RBC UR QL AUTO: NEGATIVE
SODIUM SERPL-SCNC: 138 MMOL/L (ref 136–145)
SP GR UR STRIP.AUTO: 1.01 (ref 1–1.03)
T AXIS: 9 DEGREES
UROBILINOGEN UR STRIP.AUTO-MCNC: <2 MG/DL
VENTRICULAR RATE: 72 BPM
WBC # BLD AUTO: 15.9 X10(3) UL (ref 4–11)

## 2019-09-05 PROCEDURE — 44970 LAPAROSCOPY APPENDECTOMY: CPT | Performed by: SURGERY

## 2019-09-05 PROCEDURE — 74176 CT ABD & PELVIS W/O CONTRAST: CPT | Performed by: NURSE PRACTITIONER

## 2019-09-05 PROCEDURE — 99220 INITIAL OBSERVATION CARE,LEVL III: CPT | Performed by: HOSPITALIST

## 2019-09-05 PROCEDURE — 0DTJ4ZZ RESECTION OF APPENDIX, PERCUTANEOUS ENDOSCOPIC APPROACH: ICD-10-PCS | Performed by: SURGERY

## 2019-09-05 PROCEDURE — 99244 OFF/OP CNSLTJ NEW/EST MOD 40: CPT | Performed by: SURGERY

## 2019-09-05 RX ORDER — ONDANSETRON 2 MG/ML
4 INJECTION INTRAMUSCULAR; INTRAVENOUS EVERY 6 HOURS PRN
Status: DISCONTINUED | OUTPATIENT
Start: 2019-09-05 | End: 2019-09-05

## 2019-09-05 RX ORDER — HYDROMORPHONE HYDROCHLORIDE 1 MG/ML
0.2 INJECTION, SOLUTION INTRAMUSCULAR; INTRAVENOUS; SUBCUTANEOUS EVERY 2 HOUR PRN
Status: DISCONTINUED | OUTPATIENT
Start: 2019-09-05 | End: 2019-09-07

## 2019-09-05 RX ORDER — DOCUSATE SODIUM 100 MG/1
100 CAPSULE, LIQUID FILLED ORAL 2 TIMES DAILY
Status: DISCONTINUED | OUTPATIENT
Start: 2019-09-05 | End: 2019-09-07

## 2019-09-05 RX ORDER — HYDROMORPHONE HYDROCHLORIDE 1 MG/ML
1 INJECTION, SOLUTION INTRAMUSCULAR; INTRAVENOUS; SUBCUTANEOUS ONCE
Status: DISCONTINUED | OUTPATIENT
Start: 2019-09-05 | End: 2019-09-05

## 2019-09-05 RX ORDER — SODIUM CHLORIDE, SODIUM LACTATE, POTASSIUM CHLORIDE, CALCIUM CHLORIDE 600; 310; 30; 20 MG/100ML; MG/100ML; MG/100ML; MG/100ML
INJECTION, SOLUTION INTRAVENOUS CONTINUOUS
Status: DISCONTINUED | OUTPATIENT
Start: 2019-09-05 | End: 2019-09-05 | Stop reason: HOSPADM

## 2019-09-05 RX ORDER — SODIUM CHLORIDE 9 MG/ML
INJECTION, SOLUTION INTRAVENOUS CONTINUOUS
Status: DISCONTINUED | OUTPATIENT
Start: 2019-09-05 | End: 2019-09-05 | Stop reason: HOSPADM

## 2019-09-05 RX ORDER — MORPHINE SULFATE 4 MG/ML
2 INJECTION, SOLUTION INTRAMUSCULAR; INTRAVENOUS EVERY 5 MIN PRN
Status: DISCONTINUED | OUTPATIENT
Start: 2019-09-05 | End: 2019-09-05 | Stop reason: HOSPADM

## 2019-09-05 RX ORDER — HYDROMORPHONE HYDROCHLORIDE 1 MG/ML
0.5 INJECTION, SOLUTION INTRAMUSCULAR; INTRAVENOUS; SUBCUTANEOUS ONCE
Status: COMPLETED | OUTPATIENT
Start: 2019-09-05 | End: 2019-09-05

## 2019-09-05 RX ORDER — METOCLOPRAMIDE HYDROCHLORIDE 5 MG/ML
5 INJECTION INTRAMUSCULAR; INTRAVENOUS EVERY 8 HOURS PRN
Status: DISCONTINUED | OUTPATIENT
Start: 2019-09-05 | End: 2019-09-07

## 2019-09-05 RX ORDER — ONDANSETRON 2 MG/ML
4 INJECTION INTRAMUSCULAR; INTRAVENOUS AS NEEDED
Status: DISCONTINUED | OUTPATIENT
Start: 2019-09-05 | End: 2019-09-05 | Stop reason: HOSPADM

## 2019-09-05 RX ORDER — HYDROMORPHONE HYDROCHLORIDE 1 MG/ML
1 INJECTION, SOLUTION INTRAMUSCULAR; INTRAVENOUS; SUBCUTANEOUS
Status: DISCONTINUED | OUTPATIENT
Start: 2019-09-05 | End: 2019-09-05

## 2019-09-05 RX ORDER — ONDANSETRON 2 MG/ML
4 INJECTION INTRAMUSCULAR; INTRAVENOUS ONCE
Status: COMPLETED | OUTPATIENT
Start: 2019-09-05 | End: 2019-09-05

## 2019-09-05 RX ORDER — HYDROMORPHONE HYDROCHLORIDE 1 MG/ML
1 INJECTION, SOLUTION INTRAMUSCULAR; INTRAVENOUS; SUBCUTANEOUS ONCE
Status: COMPLETED | OUTPATIENT
Start: 2019-09-05 | End: 2019-09-05

## 2019-09-05 RX ORDER — POLYETHYLENE GLYCOL 3350 17 G/17G
17 POWDER, FOR SOLUTION ORAL DAILY PRN
Status: DISCONTINUED | OUTPATIENT
Start: 2019-09-05 | End: 2019-09-07

## 2019-09-05 RX ORDER — HYDROMORPHONE HYDROCHLORIDE 1 MG/ML
INJECTION, SOLUTION INTRAMUSCULAR; INTRAVENOUS; SUBCUTANEOUS
Status: COMPLETED
Start: 2019-09-05 | End: 2019-09-05

## 2019-09-05 RX ORDER — MORPHINE SULFATE 4 MG/ML
2 INJECTION, SOLUTION INTRAMUSCULAR; INTRAVENOUS EVERY 2 HOUR PRN
Status: DISCONTINUED | OUTPATIENT
Start: 2019-09-05 | End: 2019-09-07

## 2019-09-05 RX ORDER — HYDROCODONE BITARTRATE AND ACETAMINOPHEN 5; 325 MG/1; MG/1
2 TABLET ORAL EVERY 4 HOURS PRN
Status: DISCONTINUED | OUTPATIENT
Start: 2019-09-05 | End: 2019-09-07

## 2019-09-05 RX ORDER — ONDANSETRON 2 MG/ML
4 INJECTION INTRAMUSCULAR; INTRAVENOUS EVERY 6 HOURS PRN
Status: DISCONTINUED | OUTPATIENT
Start: 2019-09-05 | End: 2019-09-07

## 2019-09-05 RX ORDER — HYDROMORPHONE HYDROCHLORIDE 1 MG/ML
0.8 INJECTION, SOLUTION INTRAMUSCULAR; INTRAVENOUS; SUBCUTANEOUS EVERY 2 HOUR PRN
Status: DISCONTINUED | OUTPATIENT
Start: 2019-09-05 | End: 2019-09-07

## 2019-09-05 RX ORDER — HYDROCODONE BITARTRATE AND ACETAMINOPHEN 5; 325 MG/1; MG/1
1 TABLET ORAL EVERY 4 HOURS PRN
Status: DISCONTINUED | OUTPATIENT
Start: 2019-09-05 | End: 2019-09-07

## 2019-09-05 RX ORDER — MORPHINE SULFATE 4 MG/ML
8 INJECTION, SOLUTION INTRAMUSCULAR; INTRAVENOUS EVERY 2 HOUR PRN
Status: DISCONTINUED | OUTPATIENT
Start: 2019-09-05 | End: 2019-09-07

## 2019-09-05 RX ORDER — BUPIVACAINE HYDROCHLORIDE AND EPINEPHRINE 5; 5 MG/ML; UG/ML
INJECTION, SOLUTION EPIDURAL; INTRACAUDAL; PERINEURAL AS NEEDED
Status: DISCONTINUED | OUTPATIENT
Start: 2019-09-05 | End: 2019-09-05 | Stop reason: HOSPADM

## 2019-09-05 RX ORDER — HYDROMORPHONE HYDROCHLORIDE 1 MG/ML
0.4 INJECTION, SOLUTION INTRAMUSCULAR; INTRAVENOUS; SUBCUTANEOUS EVERY 2 HOUR PRN
Status: DISCONTINUED | OUTPATIENT
Start: 2019-09-05 | End: 2019-09-07

## 2019-09-05 RX ORDER — HYDROMORPHONE HYDROCHLORIDE 1 MG/ML
0.4 INJECTION, SOLUTION INTRAMUSCULAR; INTRAVENOUS; SUBCUTANEOUS EVERY 5 MIN PRN
Status: DISCONTINUED | OUTPATIENT
Start: 2019-09-05 | End: 2019-09-05 | Stop reason: HOSPADM

## 2019-09-05 RX ORDER — ONDANSETRON 2 MG/ML
INJECTION INTRAMUSCULAR; INTRAVENOUS
Status: COMPLETED
Start: 2019-09-05 | End: 2019-09-05

## 2019-09-05 RX ORDER — SODIUM CHLORIDE 9 MG/ML
INJECTION, SOLUTION INTRAVENOUS CONTINUOUS
Status: DISCONTINUED | OUTPATIENT
Start: 2019-09-05 | End: 2019-09-05

## 2019-09-05 RX ORDER — NALOXONE HYDROCHLORIDE 0.4 MG/ML
80 INJECTION, SOLUTION INTRAMUSCULAR; INTRAVENOUS; SUBCUTANEOUS AS NEEDED
Status: DISCONTINUED | OUTPATIENT
Start: 2019-09-05 | End: 2019-09-05 | Stop reason: HOSPADM

## 2019-09-05 RX ORDER — BISACODYL 10 MG
10 SUPPOSITORY, RECTAL RECTAL
Status: DISCONTINUED | OUTPATIENT
Start: 2019-09-05 | End: 2019-09-07

## 2019-09-05 RX ORDER — HEPARIN SODIUM 5000 [USP'U]/ML
5000 INJECTION, SOLUTION INTRAVENOUS; SUBCUTANEOUS EVERY 8 HOURS SCHEDULED
Status: DISCONTINUED | OUTPATIENT
Start: 2019-09-06 | End: 2019-09-07

## 2019-09-05 RX ORDER — MORPHINE SULFATE 4 MG/ML
4 INJECTION, SOLUTION INTRAMUSCULAR; INTRAVENOUS EVERY 2 HOUR PRN
Status: DISCONTINUED | OUTPATIENT
Start: 2019-09-05 | End: 2019-09-07

## 2019-09-05 NOTE — ED INITIAL ASSESSMENT (HPI)
Pt c/o right flank pain since this morning, denies dysuria, c/o vomiting this morning. Pt states is on peritoneal dialysis, had a left nephrectomy.

## 2019-09-05 NOTE — ED NOTES
Patient requested that documenting nurse update his brother and mother; both called and updated on patient's plan of care.

## 2019-09-05 NOTE — ED PROVIDER NOTES
Patient Seen in: [facility] Emergency Department    History   Patient presents with:  Abdomen/Flank Pain (GI/)    Stated Complaint: right flank pain, vomiting    HPI  38y  [place] male with hypertensive nephrosclerosis, HTN, ESRD (due to H systems reviewed and negative except as noted above.     Physical Exam     ED Triage Vitals [09/05/19 1329]   /80   Pulse 85   Resp 14   Temp 98.4 °F (36.9 °C)   Temp src Oral   SpO2 95 %   O2 Device None (Room air)       Current:BP (!) 140/94   Pulse WBC Urine 5-10 (*)     Mucous Urine 1+ (*)     All other components within normal limits   CBC W/ DIFFERENTIAL - Abnormal; Notable for the following components:    WBC 15.9 (*)     RBC 2.77 (*)     HGB 8.6 (*)     HCT 26.3 (*)     Neutrophil Absolute Preli appearance. . SPLEEN:  Normal.  No enlargement or focal lesion. PANCREAS:  No demond-pancreatic inflammatory stranding ADRENALS:  Normal.  No mass or enlargement. KIDNEYS:  Left nephrectomy. No right-sided hydronephrosis.   Right renal cysts, largest measuri already. Patient had anaphylaxis with cardiac arrest during preop surgery on August 1 at [facility] when he was scheduled to get a donor kidney.   Medications that were given prior to him coding were fentanyl, lidocaine 2%, midazolam, rocuronium, prop

## 2019-09-05 NOTE — ED NOTES
Patient's ex wife Ajay Rueda called wanting to speak with patient, patient stated to tell his ex wife that he is asleep and to take a message. Ex wife requesting that patient calls him when he wakes up.

## 2019-09-06 ENCOUNTER — APPOINTMENT (OUTPATIENT)
Dept: INTERVENTIONAL RADIOLOGY/VASCULAR | Facility: HOSPITAL | Age: 44
End: 2019-09-06
Attending: INTERNAL MEDICINE
Payer: COMMERCIAL

## 2019-09-06 LAB
ANION GAP SERPL CALC-SCNC: 13 MMOL/L (ref 0–18)
BASOPHILS # BLD AUTO: 0.01 X10(3) UL (ref 0–0.2)
BASOPHILS NFR BLD AUTO: 0.1 %
BUN BLD-MCNC: 80 MG/DL (ref 7–18)
BUN/CREAT SERPL: 3.3 (ref 10–20)
CALCIUM BLD-MCNC: 8.9 MG/DL (ref 8.5–10.1)
CHLORIDE SERPL-SCNC: 101 MMOL/L (ref 98–112)
CO2 SERPL-SCNC: 24 MMOL/L (ref 21–32)
CREAT BLD-MCNC: 24.2 MG/DL (ref 0.7–1.3)
DEPRECATED RDW RBC AUTO: 44.7 FL (ref 35.1–46.3)
EOSINOPHIL # BLD AUTO: 0 X10(3) UL (ref 0–0.7)
EOSINOPHIL NFR BLD AUTO: 0 %
ERYTHROCYTE [DISTWIDTH] IN BLOOD BY AUTOMATED COUNT: 12.8 % (ref 11–15)
GLUCOSE BLD-MCNC: 140 MG/DL (ref 70–99)
HBV CORE AB SERPL QL IA: NONREACTIVE
HBV SURFACE AB SER QL: NONREACTIVE
HBV SURFACE AB SERPL IA-ACNC: 4.85 MIU/ML
HBV SURFACE AG SER-ACNC: <0.1 [IU]/L
HBV SURFACE AG SERPL QL IA: NONREACTIVE
HCT VFR BLD AUTO: 23.7 % (ref 39–53)
HGB BLD-MCNC: 8 G/DL (ref 13–17.5)
IMM GRANULOCYTES # BLD AUTO: 0.08 X10(3) UL (ref 0–1)
IMM GRANULOCYTES NFR BLD: 0.8 %
INR BLD: 1.12 (ref 0.9–1.1)
LYMPHOCYTES # BLD AUTO: 0.59 X10(3) UL (ref 1–4)
LYMPHOCYTES NFR BLD AUTO: 5.6 %
MCH RBC QN AUTO: 32.4 PG (ref 26–34)
MCHC RBC AUTO-ENTMCNC: 33.8 G/DL (ref 31–37)
MCV RBC AUTO: 96 FL (ref 80–100)
MONOCYTES # BLD AUTO: 0.4 X10(3) UL (ref 0.1–1)
MONOCYTES NFR BLD AUTO: 3.8 %
NEUTROPHILS # BLD AUTO: 9.38 X10 (3) UL (ref 1.5–7.7)
NEUTROPHILS # BLD AUTO: 9.38 X10(3) UL (ref 1.5–7.7)
NEUTROPHILS NFR BLD AUTO: 89.7 %
OSMOLALITY SERPL CALC.SUM OF ELEC: 312 MOSM/KG (ref 275–295)
PLATELET # BLD AUTO: 277 10(3)UL (ref 150–450)
POTASSIUM SERPL-SCNC: 4.2 MMOL/L (ref 3.5–5.1)
PSA SERPL DL<=0.01 NG/ML-MCNC: 14.9 SECONDS (ref 12.5–14.7)
RBC # BLD AUTO: 2.47 X10(6)UL (ref 4.3–5.7)
SODIUM SERPL-SCNC: 138 MMOL/L (ref 136–145)
WBC # BLD AUTO: 10.5 X10(3) UL (ref 4–11)

## 2019-09-06 PROCEDURE — 05HM33Z INSERTION OF INFUSION DEVICE INTO RIGHT INTERNAL JUGULAR VEIN, PERCUTANEOUS APPROACH: ICD-10-PCS | Performed by: RADIOLOGY

## 2019-09-06 PROCEDURE — 99225 SUBSEQUENT OBSERVATION CARE: CPT | Performed by: HOSPITALIST

## 2019-09-06 PROCEDURE — 99244 OFF/OP CNSLTJ NEW/EST MOD 40: CPT | Performed by: INTERNAL MEDICINE

## 2019-09-06 RX ORDER — LIDOCAINE HYDROCHLORIDE 10 MG/ML
INJECTION, SOLUTION INFILTRATION; PERINEURAL
Status: COMPLETED
Start: 2019-09-06 | End: 2019-09-06

## 2019-09-06 RX ORDER — HEPARIN SODIUM 5000 [USP'U]/ML
INJECTION, SOLUTION INTRAVENOUS; SUBCUTANEOUS
Status: COMPLETED
Start: 2019-09-06 | End: 2019-09-06

## 2019-09-06 RX ORDER — HEPARIN SODIUM 1000 [USP'U]/ML
2000 INJECTION, SOLUTION INTRAVENOUS; SUBCUTANEOUS ONCE
Status: COMPLETED | OUTPATIENT
Start: 2019-09-06 | End: 2019-09-06

## 2019-09-06 RX ORDER — MIDAZOLAM HYDROCHLORIDE 1 MG/ML
INJECTION INTRAMUSCULAR; INTRAVENOUS
Status: COMPLETED
Start: 2019-09-06 | End: 2019-09-06

## 2019-09-06 NOTE — ANESTHESIA POSTPROCEDURE EVALUATION
[address] [facility] Patient Status:  Emergency   Age/Gender 38year old male MRN [id number]   Location [address] [facility] Attending Caitlin Back MD   Hosp Day # 0 PCP [facility]       Anesthesia Post-op Note

## 2019-09-06 NOTE — PROCEDURES
[facility]  Procedure Note    Armando Noguera Patient Status:  Observation    1975 MRN [id number]   Location [facility] Attending Ashanti Green MD   Hosp Day # 0 PCP [facility]     Procedure:  Tunneled dialysis c

## 2019-09-06 NOTE — PLAN OF CARE
Pt alert and orientatedx4. Drowsy. On 3 liters of oxygen. Electrolyte protocol. IVF at [facility]. Renal diet. Dilaudid used for pain control. Zofran used for nausea, one episode of emesis, less then 50ml. Lap sites x3. IV antibotics.  Peritoneal catheter left lo FALL  Goal: Free from fall injury  Description  INTERVENTIONS:  - Assess pt frequently for physical needs  - Identify cognitive and physical deficits and behaviors that affect risk of falls.   - [place] fall precautions as indicated by assessment.  - Educ

## 2019-09-06 NOTE — PROGRESS NOTES
[facility]  Progress Note    Demetres Alize Class Patient Status:  Observation    1975 MRN [id number]   [facility] 3NW-A Attending Adele Beaver MD   Hosp Day # 0 PCP [facility]     Subjective:  No new complaints.  Pain on left si nausea     Nausea and vomiting     Dehydration     Pneumoperitoneum     Peritoneal dialysis status (HCC)     Anemia     Benign essential HTN     Peritoneal dialysis catheter dysfunction, initial encounter [facility])     Lower abdominal pain     Peritonitis ([address]

## 2019-09-06 NOTE — CONSULTS
[facility]  Report of Consultation    Elda Parent Patient Status:  Observation    1975 MRN [id number]   [facility] 3NW-A Attending Moe Munoz MD   Hosp Day # 0 PCP [facility]       Assessment / Plan:    1) ESRD- on HD never smoked. He has never used smokeless tobacco. He reports that he does not drink alcohol or use drugs.     Allergies:  No Known Allergies    Medications:    Current Facility-Administered Medications:   •  epoetin tyrese-epbx (RETACRIT) 52043 UNIT/ML injec Intake/Output Summary (Last 24 hours) at 9/6/2019 0723  Last data filed at 9/6/2019 0238  Gross per 24 hour   Intake 225 ml   Output 50 ml   Net 175 ml     Wt Readings from Last 3 Encounters:  09/05/19 : 230 lb  03/23/18 : 266 lb  03/20/18 : 255 lb 3. 2

## 2019-09-06 NOTE — PLAN OF CARE
Problem: Patient/Family Goals  Goal: Patient/Family Long Term Goal  Description  Patient's Long Term Goal: Discharge    Interventions:  - HD  - See additional Care Plan goals for specific interventions  Outcome: Progressing  Goal: Patient/Family Short Te appropriate  - Consider OT/PT consult to assist with strengthening/mobility  - Encourage toileting schedule  Outcome: Progressing     Problem: DISCHARGE PLANNING  Goal: Discharge to home or other facility with appropriate resources  Description  INTERVENTI

## 2019-09-06 NOTE — PAYOR COMM NOTE
--------------  ADMISSION REVIEW     Payor: [facility] (NON CONTRACTED IPA)  Subscriber #:  [id number]  Authorization Number: [id number]    Admit date: N/A  Admit time: N/A       Admitting Physician: Krysten Rincon MD  Attending Physician:  Demi Bhardwaj, Performed by Mana Solomon MD at [facility] 07/6962   • HERNIA UMBILICAL REPAIR ADULT N/A 6/30/2017    Performed by Nel Oviedo MD at [facility] OR   • LAPAROSCOPIC PERITONEAL DIALYSIS CATH INSERTION N/A 6/30/2017    P Skin: Skin is warm and dry. Capillary refill takes less than 2 seconds. Psychiatric: He has a normal mood and affect. His behavior is normal. Thought content normal.   Nursing note and vitals reviewed.             ED Course     Labs Reviewed   COMP METABO Result Date: 9/5/2019  PROCEDURE:  CT ABDOMEN+PELVIS (CPT=74176)  COMPARISON:  [facility] , CT APPENDIX ABD/PEL WO CONTRAST (CPT=74176), 3/19/2018, 0:37.   INDICATIONS:  right flank pain, vomiting  TECHNIQUE:  Unenhanced multislice CT scanning was performed fro CONCLUSION:  1. Possible area of focal wall thickening within the proximal to mid appendix although this could represent the appendix with adjacent small amount of fluid. Correlation with clinical symptoms for acute appendicitis.   No evidence of perforati Disposition and Plan     Clinical Impression:  Other acute appendicitis  (primary encounter diagnosis)    Disposition:  Admit  9/5/2019  6:42 pm    Follow-up:  No follow-up provider specified.       Medications Prescribed:  Current Discharge Medication List Oscar Flower is a a(n) 38year old male. Patient presents to the emergency room complaining of generalized abdominal pain that started earlier today that is now localized to the right lower quadrant.   The patient was seen and evaluated in the emergenc reports that he has never smoked. He has never used smokeless tobacco. He reports that he does not drink alcohol or use drugs.     Allergies:  No Known Allergies    Medications:    Current Facility-Administered Medications:   •  0.9% NaCl infusion, , Intra Peritonitis associated with peritoneal dialysis, subsequent encounter [facility])     Peritoneal dialysis catheter mechanical complication, initial encounter ([facility] [address]     Appendicitis, acute     Other acute appendicitis     Acute appendicitis      Acute appendic 9/6/2019 0701 Given 5000 Units Subcutaneous (Right Lower Abdomen) Alexandria Patel RN      HYDROmorphone HCl (DILAUDID) 1 MG/ML injection 0.5 mg     Date Action Dose Route User    9/5/2019 1459 Given 0.5 mg Intravenous Marbella Katz RN      HYDROmorph 9/6/2019 0701 Given 5 mg Intravenous Dm Salter RN      ondansetron HCl [facility]) injection 4 mg     Date Action Dose Route User    9/5/2019 1459 Given 4 mg Intravenous Shilpa Jack RN      ondansetron HCl (ZOFRAN) injection 4 mg     Date Action INDICATION FOR PROCEDURE: Please review the preoperative history and physical. Briefly, the patient is a 38year-old male with hx CRF on PD dialysis who presents with 1 day of generalized abdominal pain is now localized to the right lower quadrant.  The pat DESCRIPTION OF PROCEDURE: The patient was brought to the operating room, and after the induction of general endotracheal anesthesia, a time-out was performed. Next, the abdomen was prepped and draped in the usual sterile fashion.  Next, the umbilicus was gr Attention was turned to the right lower quadrant, where an acutely inflamed appendix that was non-perforated was identified. Next, the patient was placed in Trendelenburg position, right side up.  An 11-mm incision was made in the suprapubic position, throu At this point, the patient was placed in neutral rotation. The 11-mm incision in the suprapubic position was reapproximated using 0 Maxon suture with an Endo Close device and a  guide. Next, the 5-mm trocar was removed under direct vision.  Pneumoperit 2) Appendicitis- s/p laparascopic appy by Dr. Tash Flores overnight     3) HTN- resume usual amlodipine / hydralazine / doxazosin when OK to take PO     4) Anemia- due to CKD / post-op; continue EPO with dialysis        Reason for Consultation:  ESRD     Hist •  HYDROmorphone HCl (DILAUDID) 1 MG/ML injection 0.2 mg, 0.2 mg, Intravenous, Q2H PRN **OR** HYDROmorphone HCl (DILAUDID) 1 MG/ML injection 0.4 mg, 0.4 mg, Intravenous, Q2H PRN **OR** HYDROmorphone HCl (DILAUDID) 1 MG/ML injection 0.8 mg, 0.8 mg, Intraven Cardiac: Regular rate and rhythm, S1, S2 normal, no murmur, rub, or gallop  Lungs: Decreased breath sounds at the bases bilaterally.    Abdomen: Soft, non-tender. + bowel sounds, no palpable organomegaly  Extremities: Without clubbing, cyanosis; no edema  N Specimens: None     Blood Loss:  < 5 cc                         Tourniquet Time: None  Complications:  None  Drains:  None     Secondary Diagnosis:  n/A     Jeanne Mohamud  9/6/2019               Electronically signed by Татьяна Mcwilliams MD at 9/6/2019 10:

## 2019-09-06 NOTE — PROGRESS NOTES
Pharmacy Note: Renal dose adjustment for Metoclopramide (Reglan)  Anuj Brady has been prescribed Metoclopramide (Reglan) 10 mg every 8 hours as needed. Estimated Creatinine Clearance: 4.4 mL/min (A) (based on SCr of 23.1 mg/dL (H)).     His calcula

## 2019-09-06 NOTE — H&P
[address] Patient Status:  Emergency    1975 MRN [id number]   Location [address] [facility] Attending Augustina Londono MD   Hosp Day # 0 PCP [facility]     Reason for Consultation:  Acute appendi PERITONEAL DIALYSIS CATH INSERTION N/A 6/30/2017    Performed by Marcela Plasencia MD at [address] [facility] MAIN OR   • OTHER  1983    wrist fx     Family History   Problem Relation Age of Onset   • Cancer Neg    • Heart Disorder Neg       reports that he has never smoked. chronic kidney disease, on chronic dialysis (HCC)     Hypokalemia     Anemia in ESRD (end-stage renal disease) (HCC)     Abdominal pain, right lower quadrant     Peritonitis associated with peritoneal dialysis, subsequent encounter ([facility] [address]     Peritoneal jerri

## 2019-09-06 NOTE — PROGRESS NOTES
Pharmacy Note: Renal dose adjustment of Solomon Flor is a 38year old male who has been prescribed Merrem 500mg every 8 hours.   CrCl is 4.4 ml/min so the dose has been adjusted  to 500mg IV every 24 hours per hospital renal dose adjustment pr

## 2019-09-06 NOTE — CM/SW NOTE
Received call from [address] with [address] [facility] ([phone]) who confirmed they have received insurance approval and are able to accept pt for outpt HD on Monday AM.  Pt is able to go to HD MWF at 6am for the first week, but they will need to work wi

## 2019-09-06 NOTE — PLAN OF CARE
HD RN unable to collect peritoneal fluid for specimen to be sent to lab from Dr. Mike Ding. RN called Dr. Jaycee Blizzard, no new orders.

## 2019-09-06 NOTE — CM/SW NOTE
Met with pt who is a 39 y/o man with history of peritoneal dialysis currently admitted s/p lap appy. Per renal notes, pt will requires 2 weeks of HD at discharge. Pt s/p permcath placement.   Pt lives alone and is normally independent with ADLs, driving,

## 2019-09-06 NOTE — OPERATIVE REPORT
PREOPERATIVE DIAGNOSIS: Acute appendicitis. POSTOPERATIVE DIAGNOSIS: Acute appendicitis. PROCEDURE PERFORMED: Laparoscopic appendectomy. ANESTHESIA: General endotracheal anesthesia. ANESTHESIOLOGIST: Dr. Gentry Reynolds: OR staff.    Vielka Santoro explained to the patient and the family in detail, including but not limited to bleeding, infection, nondiagnostic yield, incorrect diagnosis, injury to adjacent organs and structures, postoperative infection and/or abscess creation, conversion to open pro from its adhesions to the pelvic sidewall, and using blunt dissection, a window was made in the avascular space between the mesoappendix and the base of the appendix at the base of the cecum with the [facility].  Next, a 5-mm LigaSure device was use

## 2019-09-06 NOTE — ANESTHESIA PREPROCEDURE EVALUATION
PRE-OP EVALUATION    Patient Name: Benito Harvey    Pre-op Diagnosis: Acute appendicitis [K35.80]    Procedure(s):  LAPAROSCOPIC APPENDECTOMY POSS. OPEN    Surgeon(s) and Role:     * Satnam Wilcox MD - Primary    Pre-op vitals reviewed. Temp: 98. 4 N/A 6/30/2017    Performed by Earl Navarro MD at [address] [place] [address] MAIN OR   • [facility] CATH INSERTION N/A 6/30/2017    Performed by Earl Navarro MD at [address] [place] [address] OR   • OTHER  1983    wrist fx     Social History    Tobacco Use      Smoking sta

## 2019-09-06 NOTE — PROGRESS NOTES
[facility] HOSPITALIST  Progress Note     [facility] Patient Status:  Observation    1975 MRN [id number]   [facility] 3NW-A Attending Betty Walker MD   [facility] Day [facility] PCP [facility]     Chief Complaint: abdominal pain    S: Patie Imaging data reviewed in Epic.     Medications:   • epoetin tyrese-epbx  10,000 Units Intravenous Once in dialysis   • meropenem  500 mg Intravenous Q24H   • docusate sodium  100 mg Oral BID   • Heparin Sodium (Porcine)  5,000 Units Subcutaneous [place] [address]

## 2019-09-06 NOTE — PLAN OF CARE
Patient back from IR. HD catheter C/D/I. States pain 7/10, pain medication to be given as ordered. Fav[facility]enius called, will be here this afternoon. POC discussed, all questions and concerns addressed. All safety measures in place.

## 2019-09-06 NOTE — H&P
[facility] HOSPITALIST  History and Physical     Demetres Capri Wooten Patient Status:  Emergency    1975 MRN [id number]   Location [address] [facility] Attending No att. providers found   Hosp Day # 0 PCP [facility]     Chief Complaint: total) by mouth every 4 (four) hours as needed for Nausea. Disp: 6 tablet Rfl: 0   Doxazosin Mesylate 2 MG Oral Tab Take 2 mg by mouth every morning.    Disp:  Rfl:    Cinacalcet HCl (SENSIPAR) 30 MG Oral Tab Take 30 mg by mouth daily with breakfast. Disp: .0       Recent Labs   Lab 09/05/19  1456   GLU 94   BUN 66*   CREATSERUM 23.10*   GFRAA 2*   GFRNAA 2*   CA 9.2   ALB 3.2*      K 3.7      CO2 23.0   ALKPHO 82   AST 13*   ALT 13*   BILT 0.3   TP 6.5       Estimated Creatinine Clearan

## 2019-09-06 NOTE — ED NOTES
Per the patient's request, the following people were called to be updated that the patient is having surgery.      Steven Mckeon, peritoneal dialysis RN [phone]  Mother Oscar Campos) [phone]  Kaleb Nguyen) [phone]    The patient's mother requests

## 2019-09-07 VITALS
SYSTOLIC BLOOD PRESSURE: 139 MMHG | WEIGHT: 239 LBS | HEART RATE: 72 BPM | HEIGHT: 71.5 IN | DIASTOLIC BLOOD PRESSURE: 89 MMHG | RESPIRATION RATE: 18 BRPM | OXYGEN SATURATION: 97 % | TEMPERATURE: 98 F | BODY MASS INDEX: 32.73 KG/M2

## 2019-09-07 LAB
ANION GAP SERPL CALC-SCNC: 5 MMOL/L (ref 0–18)
BUN BLD-MCNC: 40 MG/DL (ref 7–18)
BUN/CREAT SERPL: 3 (ref 10–20)
CALCIUM BLD-MCNC: 8.6 MG/DL (ref 8.5–10.1)
CHLORIDE SERPL-SCNC: 104 MMOL/L (ref 98–112)
CO2 SERPL-SCNC: 31 MMOL/L (ref 21–32)
CREAT BLD-MCNC: 13.3 MG/DL (ref 0.7–1.3)
DEPRECATED RDW RBC AUTO: 43.6 FL (ref 35.1–46.3)
ERYTHROCYTE [DISTWIDTH] IN BLOOD BY AUTOMATED COUNT: 12.5 % (ref 11–15)
GLUCOSE BLD-MCNC: 104 MG/DL (ref 70–99)
HCT VFR BLD AUTO: 23.2 % (ref 39–53)
HGB BLD-MCNC: 7.6 G/DL (ref 13–17.5)
MCH RBC QN AUTO: 31.4 PG (ref 26–34)
MCHC RBC AUTO-ENTMCNC: 32.8 G/DL (ref 31–37)
MCV RBC AUTO: 95.9 FL (ref 80–100)
OSMOLALITY SERPL CALC.SUM OF ELEC: 300 MOSM/KG (ref 275–295)
PLATELET # BLD AUTO: 262 10(3)UL (ref 150–450)
POTASSIUM SERPL-SCNC: 3.9 MMOL/L (ref 3.5–5.1)
RBC # BLD AUTO: 2.42 X10(6)UL (ref 4.3–5.7)
SODIUM SERPL-SCNC: 140 MMOL/L (ref 136–145)
WBC # BLD AUTO: 9.2 X10(3) UL (ref 4–11)

## 2019-09-07 PROCEDURE — 99024 POSTOP FOLLOW-UP VISIT: CPT | Performed by: SURGERY

## 2019-09-07 RX ORDER — DOCUSATE SODIUM 100 MG/1
100 CAPSULE, LIQUID FILLED ORAL 3 TIMES DAILY
Qty: 90 CAPSULE | Refills: 0 | Status: SHIPPED | OUTPATIENT
Start: 2019-09-07

## 2019-09-07 RX ORDER — HYDROCODONE BITARTRATE AND ACETAMINOPHEN 5; 325 MG/1; MG/1
TABLET ORAL
Qty: 20 TABLET | Refills: 0 | Status: SHIPPED | OUTPATIENT
Start: 2019-09-07

## 2019-09-07 NOTE — PROGRESS NOTES
[facility]  Progress Note    Joseajay Pinon Patient Status:  Observation    1975 MRN [id number]   [facility] 3NW-A Attending Chet Lozoya MD   Hosp Day # 0 PCP [facility]     Subjective:  Patient is doing well following hernandez Assessment/Plan:  Patient Active Problem List:     Stage 4 chronic kidney disease ([facility] [address]     Umbilical hernia without obstruction and without gangrene     Knee effusion, left     Acute pain of left knee     Patella, chondromalacia, left     ESRD (end st

## 2019-09-07 NOTE — PLAN OF CARE
Problem: PAIN - ADULT  Goal: Verbalizes/displays adequate comfort level or patient's stated pain goal  Description  INTERVENTIONS:  - Encourage pt to monitor pain and request assistance  - Assess pain using appropriate pain scale  - Administer analgesic appropriate  - Identify discharge learning needs (meds, wound care, etc)  - Arrange for interpreters to assist at discharge as needed  - Consider post-discharge preferences of patient/family/discharge partner  - Complete POLST form as appropriate  - Assess

## 2019-09-07 NOTE — PLAN OF CARE
Patients IV d/c'd, catheter intact. All discharge instructions explained all questions answered. Patient declined wheelchair, RN ambulated with patient to elevator. RN called codey Fang For discharge. Follow up with nephro as discussed.  Patient s

## 2019-09-07 NOTE — DISCHARGE SUMMARY
[facility]  Discharge Summary    Kim Vizcarra Patient Status:  Observation    1975 MRN [id number]   [facility] 3NW-A Attending Natividad Floyd MD   Hosp Day # 0 PCP [facility]     Date of Admission: 2019    Date of Disc mouth every morning. Cinacalcet HCl (SENSIPAR) 30 MG Oral Tab  Take 30 mg by mouth daily with breakfast.    Cholecalciferol (VITAMIN D3) 83474 units Oral Cap  Take 50,000 Units by mouth once a week.       HydrALAZINE HCl (APRESOLINE) 100 MG Oral Tab  T

## 2019-09-07 NOTE — PLAN OF CARE
Problem: Patient/Family Goals  Goal: Patient/Family Long Term Goal  Description  Patient's Long Term Goal: discharge    Interventions:  - advance diet  - See additional Care Plan goals for specific interventions  Outcome: Adequate for Discharge  Goal: Pa of injury  - Provide assistive devices as appropriate  - Consider OT/PT consult to assist with strengthening/mobility  - Encourage toileting schedule  Outcome: Adequate for Discharge     Problem: DISCHARGE PLANNING  Goal: Discharge to home or other facilit

## 2019-09-08 NOTE — DISCHARGE SUMMARY
[facility] HOSPITALIST  DISCHARGE SUMMARY     Anuj Nichols Patient Status:  Observation    1975 MRN [id number]   [facility] 3NW-A Attending No att. providers found   [address] Day # 0 PCP [place] [facility]     Date of Admission: 2019  Date in stable condition    Lace+ Score: 25  59-90 High Risk  29-58 Medium Risk  0-28   Low Risk         TCM Follow-Up Recommendation:  LACE 29-58:  Moderate Risk of readmission after discharge from the hospital.    Procedures during hospitalization:   Tunneled APRESOLINE      Take 100 mg by mouth 3 (three) times daily. Refills:  0     ondansetron 8 MG Tbdp  Commonly known as:  ZOFRAN-ODT      Take 1 tablet (8 mg total) by mouth every 4 (four) hours as needed for Nausea.    Quantity:  6 tablet  Refills:  0     R guarding. Neurologic: No focal neurological deficits. Musculoskeletal: Moves all extremities. Extremities: No edema.   -----------------------------------------------------------------------------------------------  PATIENT DISCHARGE INSTRUCTIONS: See e

## 2019-09-11 ENCOUNTER — TELEPHONE (OUTPATIENT)
Dept: SURGERY | Facility: CLINIC | Age: 44
End: 2019-09-11

## 2019-09-11 NOTE — TELEPHONE ENCOUNTER
Pt is requesting additional norco - script written for Norco 5-325 #10, informed pt that this is all that will be written and he should alternate with ibuprofen.

## 2019-09-17 ENCOUNTER — OFFICE VISIT (OUTPATIENT)
Dept: SURGERY | Facility: CLINIC | Age: 44
End: 2019-09-17

## 2019-09-17 VITALS
BODY MASS INDEX: 32.45 KG/M2 | DIASTOLIC BLOOD PRESSURE: 78 MMHG | TEMPERATURE: 99 F | HEART RATE: 84 BPM | SYSTOLIC BLOOD PRESSURE: 142 MMHG | HEIGHT: 71.5 IN | WEIGHT: 237 LBS

## 2019-09-17 DIAGNOSIS — Z90.49 HISTORY OF LAPAROSCOPIC APPENDECTOMY: ICD-10-CM

## 2019-09-17 DIAGNOSIS — K35.30 ACUTE APPENDICITIS WITH LOCALIZED PERITONITIS, WITHOUT PERFORATION, ABSCESS, OR GANGRENE: Primary | ICD-10-CM

## 2019-09-17 PROBLEM — K35.80 ACUTE APPENDICITIS: Status: RESOLVED | Noted: 2019-09-05 | Resolved: 2019-09-17

## 2019-09-17 PROBLEM — K35.80 APPENDICITIS, ACUTE: Status: RESOLVED | Noted: 2019-09-05 | Resolved: 2019-09-17

## 2019-09-17 PROBLEM — K35.890 OTHER ACUTE APPENDICITIS: Status: RESOLVED | Noted: 2019-09-05 | Resolved: 2019-09-17

## 2019-09-17 PROCEDURE — 99024 POSTOP FOLLOW-UP VISIT: CPT | Performed by: PHYSICIAN ASSISTANT

## 2019-09-17 NOTE — PROGRESS NOTES
Post Operative Visit Note       Active Problems  1. Acute appendicitis with localized peritonitis, without perforation, abscess, or gangrene    2.  History of laparoscopic appendectomy, 9/5/2019         Chief Complaint   Patient presents with:  Post-Op: [facility] MAIN OR   • LAPAROSCOPIC PERITONEAL DIALYSIS CATH INSERTION N/A 6/30/2017    Performed by Claudean Flick, MD at [facility] MAIN OR   • OTHER  1983    wrist fx       The family history and social history have been reviewed by me today.     Family History   Proble Take 5 mg by mouth daily. , Disp: , Rfl:   •  allopurinol (ZYLOPRIM) 100 MG Oral Tab, Take 100 mg by mouth as needed. , Disp: , Rfl:       Review of Systems  The Review of Systems has been reviewed by me during today.   Review of Systems   Constitutional: cellulitis. PD catheter in place without surrounding erythema. Musculoskeletal: Normal range of motion. He exhibits no edema. Neurological: He is alert and oriented to person, place, and time. Skin: Skin is warm and dry. No rash noted.  He is not jerri

## 2019-09-19 ENCOUNTER — TELEPHONE (OUTPATIENT)
Dept: NEPHROLOGY | Facility: CLINIC | Age: 44
End: 2019-09-19

## 2019-09-19 DIAGNOSIS — N18.6 ESRD (END STAGE RENAL DISEASE) (HCC): Primary | ICD-10-CM

## 2019-09-23 ENCOUNTER — TELEPHONE (OUTPATIENT)
Dept: NEPHROLOGY | Facility: CLINIC | Age: 44
End: 2019-09-23

## 2019-09-23 NOTE — TELEPHONE ENCOUNTER
Sena Méndez from [facility] called earlier this morning asking for referral for perma cath removal procedure to be done today at 2pm.  I called pt's pcp office (Dr Jaycee Duff) and spoke with Sena Méndez, referral specialist. I faxed her info from emergency admission.   She called b

## 2019-09-23 NOTE — TELEPHONE ENCOUNTER
Omaira (not Lew Black) from [facility] ins verification called back around 1:45pm.  She said she spoke with Chely Hercules at [address] [place] [address].   Chely Hercules said today's procedure DOES need a referral. PCP should submit clinical info for out of network procedure or re-direct pt alejandra

## 2019-09-26 ENCOUNTER — HOSPITAL ENCOUNTER (OUTPATIENT)
Dept: INTERVENTIONAL RADIOLOGY/VASCULAR | Facility: HOSPITAL | Age: 44
Discharge: HOME | End: 2019-09-26
Attending: INTERNAL MEDICINE | Admitting: INTERNAL MEDICINE
Payer: COMMERCIAL

## 2019-09-26 VITALS — OXYGEN SATURATION: 99 % | TEMPERATURE: 98 F | RESPIRATION RATE: 14 BRPM

## 2019-09-26 DIAGNOSIS — N18.6 ESRD (END STAGE RENAL DISEASE) (HCC): ICD-10-CM

## 2019-09-26 PROCEDURE — 85610 PROTHROMBIN TIME: CPT

## 2019-09-26 PROCEDURE — 02PY33Z REMOVAL OF INFUSION DEVICE FROM GREAT VESSEL, PERCUTANEOUS APPROACH: ICD-10-PCS | Performed by: RADIOLOGY

## 2019-09-26 PROCEDURE — 36589 REMOVAL TUNNELED CV CATH: CPT

## 2019-09-26 RX ORDER — SODIUM CHLORIDE 9 MG/ML
INJECTION, SOLUTION INTRAVENOUS CONTINUOUS
Status: DISCONTINUED | OUTPATIENT
Start: 2019-09-26 | End: 2019-09-26

## 2019-10-02 NOTE — PROGRESS NOTES
Temp cath removed per Dr Bria Oakes, pressure held and site dressed per IR team. Dressing c/d/i . [id number] Theresa Iraheta for immediate discharge per Dr Bria Oakes. 1340: avs given to pt.  Pt discharged to home ambulatory in stable condition Female

## 2022-11-29 ENCOUNTER — HOSPITAL ENCOUNTER (EMERGENCY)
Facility: HOSPITAL | Age: 47
Discharge: HOME | End: 2022-11-29
Attending: EMERGENCY MEDICINE
Payer: COMMERCIAL

## 2022-11-29 ENCOUNTER — APPOINTMENT (OUTPATIENT)
Dept: GENERAL RADIOLOGY | Facility: HOSPITAL | Age: 47
End: 2022-11-29
Attending: EMERGENCY MEDICINE
Payer: COMMERCIAL

## 2022-11-29 VITALS
OXYGEN SATURATION: 99 % | TEMPERATURE: 98 F | RESPIRATION RATE: 20 BRPM | SYSTOLIC BLOOD PRESSURE: 106 MMHG | DIASTOLIC BLOOD PRESSURE: 83 MMHG | HEART RATE: 70 BPM

## 2022-11-29 DIAGNOSIS — S92.512A CLOSED DISPLACED FRACTURE OF PROXIMAL PHALANX OF LESSER TOE OF LEFT FOOT, INITIAL ENCOUNTER: Primary | ICD-10-CM

## 2022-11-29 PROCEDURE — 28510 TREATMENT OF TOE FRACTURE: CPT

## 2022-11-29 PROCEDURE — 73630 X-RAY EXAM OF FOOT: CPT | Performed by: EMERGENCY MEDICINE

## 2022-11-29 PROCEDURE — 99283 EMERGENCY DEPT VISIT LOW MDM: CPT

## 2022-11-29 PROCEDURE — 99284 EMERGENCY DEPT VISIT MOD MDM: CPT

## 2022-11-29 RX ORDER — HYDROCODONE BITARTRATE AND ACETAMINOPHEN 5; 325 MG/1; MG/1
1-2 TABLET ORAL EVERY 6 HOURS PRN
Qty: 10 TABLET | Refills: 0 | Status: SHIPPED | OUTPATIENT
Start: 2022-11-29 | End: 2022-12-06

## 2022-11-29 RX ORDER — HYDROCODONE BITARTRATE AND ACETAMINOPHEN 5; 325 MG/1; MG/1
1-2 TABLET ORAL EVERY 6 HOURS PRN
Qty: 10 TABLET | Refills: 0 | Status: SHIPPED | OUTPATIENT
Start: 2022-11-29 | End: 2022-11-29

## 2022-11-29 RX ORDER — HYDROCODONE BITARTRATE AND ACETAMINOPHEN 5; 325 MG/1; MG/1
1 TABLET ORAL ONCE
Status: COMPLETED | OUTPATIENT
Start: 2022-11-29 | End: 2022-11-29

## 2023-10-08 ENCOUNTER — APPOINTMENT (OUTPATIENT)
Dept: GENERAL RADIOLOGY | Facility: HOSPITAL | Age: 48
End: 2023-10-08
Attending: EMERGENCY MEDICINE
Payer: COMMERCIAL

## 2023-10-08 ENCOUNTER — HOSPITAL ENCOUNTER (EMERGENCY)
Facility: HOSPITAL | Age: 48
Discharge: HOME | End: 2023-10-08
Attending: EMERGENCY MEDICINE
Payer: COMMERCIAL

## 2023-10-08 VITALS
DIASTOLIC BLOOD PRESSURE: 84 MMHG | HEART RATE: 65 BPM | SYSTOLIC BLOOD PRESSURE: 137 MMHG | RESPIRATION RATE: 14 BRPM | HEIGHT: 72 IN | BODY MASS INDEX: 31.15 KG/M2 | OXYGEN SATURATION: 96 % | WEIGHT: 230 LBS

## 2023-10-08 DIAGNOSIS — S20.212A CONTUSION OF LEFT CHEST WALL, INITIAL ENCOUNTER: Primary | ICD-10-CM

## 2023-10-08 DIAGNOSIS — S30.0XXA LUMBAR CONTUSION, INITIAL ENCOUNTER: ICD-10-CM

## 2023-10-08 PROCEDURE — 72110 X-RAY EXAM L-2 SPINE 4/>VWS: CPT | Performed by: EMERGENCY MEDICINE

## 2023-10-08 PROCEDURE — 96372 THER/PROPH/DIAG INJ SC/IM: CPT

## 2023-10-08 PROCEDURE — 71111 X-RAY EXAM RIBS/CHEST4/> VWS: CPT | Performed by: EMERGENCY MEDICINE

## 2023-10-08 PROCEDURE — 99284 EMERGENCY DEPT VISIT MOD MDM: CPT

## 2023-10-08 PROCEDURE — 71100 X-RAY EXAM RIBS UNI 2 VIEWS: CPT | Performed by: EMERGENCY MEDICINE

## 2023-10-08 PROCEDURE — 99283 EMERGENCY DEPT VISIT LOW MDM: CPT

## 2023-10-08 PROCEDURE — 71101 X-RAY EXAM UNILAT RIBS/CHEST: CPT | Performed by: EMERGENCY MEDICINE

## 2023-10-08 RX ORDER — KETOROLAC TROMETHAMINE 15 MG/ML
30 INJECTION, SOLUTION INTRAMUSCULAR; INTRAVENOUS ONCE
Status: COMPLETED | OUTPATIENT
Start: 2023-10-08 | End: 2023-10-08

## 2023-10-08 RX ORDER — TACROLIMUS 5 MG/1
4 CAPSULE ORAL
COMMUNITY

## 2023-10-08 RX ORDER — METOPROLOL TARTRATE 50 MG/1
50 TABLET, FILM COATED ORAL 2 TIMES DAILY
COMMUNITY

## 2023-10-08 RX ORDER — PREDNISONE 5 MG/1
5 TABLET ORAL DAILY
COMMUNITY

## 2023-10-08 RX ORDER — MYCOPHENOLIC ACID 180 MG/1
180 TABLET, DELAYED RELEASE ORAL
COMMUNITY

## 2023-10-08 RX ORDER — HYDROCODONE BITARTRATE AND ACETAMINOPHEN 5; 325 MG/1; MG/1
2 TABLET ORAL ONCE
Status: COMPLETED | OUTPATIENT
Start: 2023-10-08 | End: 2023-10-08

## 2023-10-08 RX ORDER — TACROLIMUS 5 MG/1
5 CAPSULE ORAL EVERY MORNING
COMMUNITY

## 2023-10-08 RX ORDER — MAGNESIUM OXIDE 400 MG/1
400 TABLET ORAL DAILY
COMMUNITY

## 2023-10-08 RX ORDER — ACETAMINOPHEN AND CODEINE PHOSPHATE 300; 30 MG/1; MG/1
TABLET ORAL EVERY 4 HOURS PRN
Qty: 20 TABLET | Refills: 0 | Status: SHIPPED | OUTPATIENT
Start: 2023-10-08

## 2023-10-08 NOTE — DISCHARGE INSTRUCTIONS
Take 1000 mg acetaminophen (2 Tylenol tablets) and/or 600 mg ibuprofen (3 Advil tablets) every 6 hours as needed for pain or fever.     May swap regular Tylenol with Tylenol/codeine for severe pain

## 2023-10-08 NOTE — ED INITIAL ASSESSMENT (HPI)
To the ED s/p fall down approx 8 steps at home. States he missed the step and fell down onto his back and slid down 8 steps. Pain is located in the lumbar spine above the buttocks.

## 2024-01-16 ENCOUNTER — HOSPITAL ENCOUNTER (EMERGENCY)
Facility: HOSPITAL | Age: 49
Discharge: HOME | End: 2024-01-17
Attending: EMERGENCY MEDICINE
Payer: COMMERCIAL

## 2024-01-16 ENCOUNTER — APPOINTMENT (OUTPATIENT)
Dept: CT IMAGING | Facility: HOSPITAL | Age: 49
End: 2024-01-16
Attending: EMERGENCY MEDICINE
Payer: COMMERCIAL

## 2024-01-16 DIAGNOSIS — K57.92 ACUTE DIVERTICULITIS: Primary | ICD-10-CM

## 2024-01-16 LAB
ALBUMIN SERPL-MCNC: 3.8 G/DL (ref 3.4–5)
ALBUMIN/GLOB SERPL: 0.8 {RATIO} (ref 1–2)
ALP LIVER SERPL-CCNC: 62 U/L
ALT SERPL-CCNC: 25 U/L
ANION GAP SERPL CALC-SCNC: 5 MMOL/L (ref 0–18)
AST SERPL-CCNC: 13 U/L (ref 15–37)
BASOPHILS # BLD AUTO: 0.03 X10(3) UL (ref 0–0.2)
BASOPHILS NFR BLD AUTO: 0.2 %
BILIRUB SERPL-MCNC: 1.2 MG/DL (ref 0.1–2)
BILIRUB UR QL STRIP.AUTO: NEGATIVE
BUN BLD-MCNC: 9 MG/DL (ref 9–23)
CALCIUM BLD-MCNC: 9.4 MG/DL (ref 8.5–10.1)
CHLORIDE SERPL-SCNC: 109 MMOL/L (ref 98–112)
CLARITY UR REFRACT.AUTO: CLEAR
CO2 SERPL-SCNC: 25 MMOL/L (ref 21–32)
COLOR UR AUTO: YELLOW
CREAT BLD-MCNC: 1.26 MG/DL
EGFRCR SERPLBLD CKD-EPI 2021: 70 ML/MIN/1.73M2 (ref 60–?)
EOSINOPHIL # BLD AUTO: 0.01 X10(3) UL (ref 0–0.7)
EOSINOPHIL NFR BLD AUTO: 0.1 %
ERYTHROCYTE [DISTWIDTH] IN BLOOD BY AUTOMATED COUNT: 13.3 %
GLOBULIN PLAS-MCNC: 4.8 G/DL (ref 2.8–4.4)
GLUCOSE BLD-MCNC: 111 MG/DL (ref 70–99)
GLUCOSE UR STRIP.AUTO-MCNC: NORMAL MG/DL
HCT VFR BLD AUTO: 43.8 %
HGB BLD-MCNC: 15.1 G/DL
IMM GRANULOCYTES # BLD AUTO: 0.07 X10(3) UL (ref 0–1)
IMM GRANULOCYTES NFR BLD: 0.5 %
KETONES UR STRIP.AUTO-MCNC: 60 MG/DL
LEUKOCYTE ESTERASE UR QL STRIP.AUTO: NEGATIVE
LIPASE SERPL-CCNC: 25 U/L (ref 13–75)
LYMPHOCYTES # BLD AUTO: 4.74 X10(3) UL (ref 1–4)
LYMPHOCYTES NFR BLD AUTO: 32.2 %
MCH RBC QN AUTO: 32.3 PG (ref 26–34)
MCHC RBC AUTO-ENTMCNC: 34.5 G/DL (ref 31–37)
MCV RBC AUTO: 93.8 FL
MONOCYTES # BLD AUTO: 1 X10(3) UL (ref 0.1–1)
MONOCYTES NFR BLD AUTO: 6.8 %
NEUTROPHILS # BLD AUTO: 8.88 X10 (3) UL (ref 1.5–7.7)
NEUTROPHILS # BLD AUTO: 8.88 X10(3) UL (ref 1.5–7.7)
NEUTROPHILS NFR BLD AUTO: 60.2 %
NITRITE UR QL STRIP.AUTO: NEGATIVE
OSMOLALITY SERPL CALC.SUM OF ELEC: 287 MOSM/KG (ref 275–295)
PH UR STRIP.AUTO: 6.5 [PH] (ref 5–8)
PLATELET # BLD AUTO: 311 10(3)UL (ref 150–450)
POTASSIUM SERPL-SCNC: 3.9 MMOL/L (ref 3.5–5.1)
PROT SERPL-MCNC: 8.6 G/DL (ref 6.4–8.2)
PROT UR STRIP.AUTO-MCNC: 30 MG/DL
RBC # BLD AUTO: 4.67 X10(6)UL
RBC UR QL AUTO: NEGATIVE
SODIUM SERPL-SCNC: 139 MMOL/L (ref 136–145)
SP GR UR STRIP.AUTO: 1.02 (ref 1–1.03)
UROBILINOGEN UR STRIP.AUTO-MCNC: NORMAL MG/DL
WBC # BLD AUTO: 14.7 X10(3) UL (ref 4–11)

## 2024-01-16 PROCEDURE — 85025 COMPLETE CBC W/AUTO DIFF WBC: CPT | Performed by: EMERGENCY MEDICINE

## 2024-01-16 PROCEDURE — 99285 EMERGENCY DEPT VISIT HI MDM: CPT

## 2024-01-16 PROCEDURE — 99284 EMERGENCY DEPT VISIT MOD MDM: CPT

## 2024-01-16 PROCEDURE — 83690 ASSAY OF LIPASE: CPT | Performed by: EMERGENCY MEDICINE

## 2024-01-16 PROCEDURE — 85025 COMPLETE CBC W/AUTO DIFF WBC: CPT

## 2024-01-16 PROCEDURE — 96361 HYDRATE IV INFUSION ADD-ON: CPT

## 2024-01-16 PROCEDURE — 87086 URINE CULTURE/COLONY COUNT: CPT | Performed by: EMERGENCY MEDICINE

## 2024-01-16 PROCEDURE — 74176 CT ABD & PELVIS W/O CONTRAST: CPT | Performed by: EMERGENCY MEDICINE

## 2024-01-16 PROCEDURE — 80053 COMPREHEN METABOLIC PANEL: CPT | Performed by: EMERGENCY MEDICINE

## 2024-01-16 PROCEDURE — 80053 COMPREHEN METABOLIC PANEL: CPT

## 2024-01-16 PROCEDURE — 81001 URINALYSIS AUTO W/SCOPE: CPT | Performed by: EMERGENCY MEDICINE

## 2024-01-16 PROCEDURE — 96374 THER/PROPH/DIAG INJ IV PUSH: CPT

## 2024-01-16 PROCEDURE — 83690 ASSAY OF LIPASE: CPT

## 2024-01-16 PROCEDURE — 96376 TX/PRO/DX INJ SAME DRUG ADON: CPT

## 2024-01-16 PROCEDURE — 96375 TX/PRO/DX INJ NEW DRUG ADDON: CPT

## 2024-01-16 RX ORDER — HYDROMORPHONE HYDROCHLORIDE 1 MG/ML
1 INJECTION, SOLUTION INTRAMUSCULAR; INTRAVENOUS; SUBCUTANEOUS EVERY 30 MIN PRN
Status: DISCONTINUED | OUTPATIENT
Start: 2024-01-16 | End: 2024-01-17

## 2024-01-16 RX ORDER — ONDANSETRON 2 MG/ML
4 INJECTION INTRAMUSCULAR; INTRAVENOUS ONCE
Status: COMPLETED | OUTPATIENT
Start: 2024-01-16 | End: 2024-01-16

## 2024-01-16 RX ORDER — SODIUM CHLORIDE 9 MG/ML
INJECTION, SOLUTION INTRAVENOUS CONTINUOUS
Status: DISCONTINUED | OUTPATIENT
Start: 2024-01-16 | End: 2024-01-17

## 2024-01-17 VITALS
BODY MASS INDEX: 35 KG/M2 | HEART RATE: 73 BPM | TEMPERATURE: 98 F | SYSTOLIC BLOOD PRESSURE: 125 MMHG | HEIGHT: 71 IN | DIASTOLIC BLOOD PRESSURE: 81 MMHG | OXYGEN SATURATION: 92 % | RESPIRATION RATE: 18 BRPM | WEIGHT: 250 LBS

## 2024-01-17 PROCEDURE — 96361 HYDRATE IV INFUSION ADD-ON: CPT

## 2024-01-17 RX ORDER — ONDANSETRON 4 MG/1
4 TABLET, ORALLY DISINTEGRATING ORAL EVERY 8 HOURS PRN
Qty: 20 TABLET | Refills: 0 | Status: SHIPPED | OUTPATIENT
Start: 2024-01-17 | End: 2024-01-24

## 2024-01-17 RX ORDER — CEFPODOXIME PROXETIL 200 MG/1
400 TABLET, FILM COATED ORAL 2 TIMES DAILY
Qty: 28 TABLET | Refills: 0 | Status: SHIPPED | OUTPATIENT
Start: 2024-01-17 | End: 2024-01-24

## 2024-01-17 RX ORDER — METRONIDAZOLE 500 MG/1
500 TABLET ORAL ONCE
Status: COMPLETED | OUTPATIENT
Start: 2024-01-17 | End: 2024-01-17

## 2024-01-17 RX ORDER — METRONIDAZOLE 500 MG/1
500 TABLET ORAL 2 TIMES DAILY
Qty: 14 TABLET | Refills: 0 | Status: SHIPPED | OUTPATIENT
Start: 2024-01-17 | End: 2024-01-24

## 2024-01-17 RX ORDER — LEVOFLOXACIN 750 MG/1
750 TABLET, FILM COATED ORAL ONCE
Status: COMPLETED | OUTPATIENT
Start: 2024-01-17 | End: 2024-01-17

## 2024-01-17 RX ORDER — HYDROCODONE BITARTRATE AND ACETAMINOPHEN 5; 325 MG/1; MG/1
1-2 TABLET ORAL EVERY 6 HOURS PRN
Qty: 20 TABLET | Refills: 0 | Status: SHIPPED | OUTPATIENT
Start: 2024-01-17 | End: 2024-01-22

## 2024-01-17 NOTE — ED PROVIDER NOTES
Patient Seen in: [facility] Emergency Department      History     Chief Complaint   Patient presents with    Abdomen/Flank Pain     Pain in stomach, says he had infection due to his kidney transplant about 2 years ago.      Stated Complaint: Abdomen/Flank pain.    Subjective:   HPI    Patient is a 48-year-old male presents to emergency room for evaluation of abdominal pain.  Patient complains of left-sided abdominal pain starting last evening.  Symptoms described as constant, sharp with intermittent jabbing pains.  Denies radiation to his back.  He had some diarrhea yesterday but stool is normalized today.  Denies fever or vomiting.  Denies urinary symptoms such as frequency, hematuria or dysuria.  History of appendectomy.  History of kidney transplant 3 years ago at [place].    Objective:   Past Medical History:   Diagnosis Date    Essential hypertension     Gout     High blood pressure     Renal disorder     ESRD on dialysis    Visual impairment     GLASSES              Past Surgical History:   Procedure Laterality Date    APPENDECTOMY      DIALYSIS ACCESS SYSTEM Right 06/2017    OTHER  1983    wrist fx                No pertinent social history.            Review of Systems    Positive for stated complaint: Abdomen/Flank pain.  Other systems are as noted in HPI.  Constitutional and vital signs reviewed.      All other systems reviewed and negative except as noted above.    Physical Exam     ED Triage Vitals [01/16/24 2017]   BP (!) 141/92   Pulse 90   Resp 18   Temp 98.3 °F (36.8 °C)   Temp src Oral   SpO2 97 %   O2 Device None (Room air)       Current:/81   Pulse 73   Temp 98.4 °F (36.9 °C) (Oral)   Resp 18   Ht 180.3 cm (5' 11\")   Wt 113.4 kg   SpO2 92%   BMI 34.87 kg/m²         Physical Exam    GENERAL: No acute distress, well appearing and non-toxic, Alert and oriented X 3   HEENT: Normocephalic, atraumatic.  Moist mucous membranes.  Pupils equal round reactive to light and accommodation,  extraocular motion is intact, sclerae white, conjunctiva is pink.  Oropharynx is unremarkable, no exudate.  NECK: Supple, trachea midline, no lymphadenopathy.   LUNG: Lungs clear to auscultation bilaterally, no wheezing, no rales, no rhonchi.  CARDIOVASCULAR: Regular rate and rhythm.  Normal S1S2.  No S3S4 or murmur.  ABDOMEN: Bowel sounds are present. Soft. nondistended, no pulsatile masses.  Patient has mild left upper quadrant tenderness and mild left mid abdominal tenderness without rebound, guarding or rigidity  MUSCULOSKELETAL: No calf tenderness.  Dorsalis and Posterior Tibial pulses present. No clubbing. No cyanosis.  No edema.   SKIN EXAMINATIoN: Warm and dry with normal appearance.  No rashes or lesions.  NEUROLOGICAL:  Motor strength intact all groups.  normal sensation, speech intact    ED Course     Labs Reviewed   COMP METABOLIC PANEL (14) - Abnormal; Notable for the following components:       Result Value    Glucose 111 (*)     AST 13 (*)     Total Protein 8.6 (*)     Globulin  4.8 (*)     A/G Ratio 0.8 (*)     All other components within normal limits   URINALYSIS, ROUTINE - Abnormal; Notable for the following components:    Ketones Urine 60 (*)     Protein Urine 30 (*)     Squamous Epi. Cells Few (*)     All other components within normal limits   CBC W/ DIFFERENTIAL - Abnormal; Notable for the following components:    WBC 14.7 (*)     Neutrophil Absolute Prelim 8.88 (*)     Neutrophil Absolute 8.88 (*)     Lymphocyte Absolute 4.74 (*)     All other components within normal limits   LIPASE - Normal   CBC WITH DIFFERENTIAL WITH PLATELET    Narrative:     The following orders were created for panel order CBC With Differential With Platelet.  Procedure                               Abnormality         Status                     ---------                               -----------         ------                     CBC W/ DIFFERENTIAL[[id number]]          Abnormal            Final result                  Please view results for these tests on the individual orders.   RAINBOW DRAW LAVENDER   RAINBOW DRAW LIGHT GREEN   RAINBOW DRAW BLUE   URINE CULTURE, ROUTINE   White blood cell count 14.7          I personally reviewd CT images of abdomen and pelvis and independent interpretation shows no obvious kidney stones.  I also viewed formal radiology report as read by radiology with findings below:    CT ABDOMEN+PELVIS(CPT=74176)    Result Date: 1/16/2024  PROCEDURE:  CT ABDOMEN+PELVIS (CPT=74176)  COMPARISON:  [facility] , CT, CT ABDOMEN+PELVIS(CPT=74176), 9/05/2019, 5:20 PM.  INDICATIONS:  Abdomen/Flank pain.  TECHNIQUE:  Unenhanced multislice CT scanning was performed from the dome of the diaphragm to the pubic symphysis.  Dose reduction techniques were used. Dose information is transmitted to the ACR (American College of Radiology) NRDR (National Radiology Data Registry) which includes the Dose Index Registry.  PATIENT STATED HISTORY: (As transcribed by Technologist)  abd pain, vomiting    FINDINGS:  LIVER:  No enlargement, atrophy, abnormal density, or significant focal lesion.  BILIARY:  No visible dilatation or calcification.  PANCREAS:  No lesion, fluid collection, ductal dilatation, or atrophy.  SPLEEN:  No enlargement or focal lesion.  KIDNEYS:  The left kidney is not visualized and may have been surgically removed.  Right native kidney is small and atrophic with a few nonobstructing calcifications.  Within the superior to mid right kidney is a 2.9 cm low-density lesion which is incompletely characterized on this noncontrast study but most likely represents a cyst.  A right lower quadrant transplant is present.  No perinephric fluid collections or definite hydronephrosis appreciated.  ADRENALS:  No mass or enlargement.  AORTA/VASCULAR:    Unremarkable as seen on non-contrast imaging. RETROPERITONEUM:  No mass or adenopathy.  BOWEL/MESENTERY:  Normal caliber small and large bowel.  Colonic diverticulosis.  Within  the mid to distal colon is bowel wall thickening with pericolonic inflammatory changes associated with diverticula consistent with acute diverticulitis.  No pericolonic focal fluid collection to suggest an abscess.  Trace free fluid.  ABDOMINAL WALL:  No mass or hernia.  URINARY BLADDER:  The bladder is not well distended.  No visible focal wall thickening, lesion, or calculus.  PELVIC NODES:  No adenopathy.  PELVIC ORGANS:  Pelvic organs appropriate for patient age.  BONES:  No bony lesion or fracture.  LUNG BASES:  Bibasilar atelectasis and or scar.  No focal consolidation or pleural effusion.             CONCLUSION:  1. Acute diverticulitis involves the mid to distal descending colon as detailed above.  Recommend follow-up to ensure resolution and to exclude an underlying mass as clinically indicated. 2. Postsurgical changes.   LOCATION:  [facility]   Dictated by (CST): Tracey Luu MD on 1/16/2024 at 11:49 PM     Finalized by (CST): Tracey Luu MD on 1/16/2024 at 11:53 PM          Medications   ondansetron (Zofran) 4 MG/2ML injection 4 mg (4 mg Intravenous Given 1/16/24 2232)   ondansetron (Zofran) 4 MG/2ML injection 4 mg (4 mg Intravenous Given 1/16/24 2348)   levoFLOXacin (Levaquin) tab 750 mg (750 mg Oral Given 1/17/24 0038)   metRONIDAZOLE (Flagyl) tab 500 mg (500 mg Oral Given 1/17/24 0038)            MDM      Patient is a 48-year-old male presents emergency room for evaluation left-sided abdominal pain.  Differential includes kidney stone, diverticulitis.  Patient laboratory test performed showing leukocytosis.  CT of the abdomen pelvis shows  acute diverticulitis without complication.  Patient given Levaquin and Flagyl here.  Patient we discharged home with cefpodoxime and Flagyl for 7 days.  Recommend GI follow-up in 2 weeks.  Recommend colonoscopy in 6 weeks.  Norco for pain at home and Zofran for nausea/vomiting.    Patient was screened and evaluated during this visit.   As a treating physician attending to  the patient, I determined, within reasonable clinical confidence and prior to discharge, that an emergency medical condition was not or was no longer present.  There was no indication for further evaluation, treatment or admission on an emergency basis.  Comprehensive verbal and written discharge and follow-up instructions were provided to help prevent relapse or worsening.  Patient was instructed to follow-up with her primary care provider for further evaluation and treatment, but to return immediately to the ER for worsening, concerning, new, changing or persisting symptoms.  I discussed the case with the patient and they had no questions, complaints, or concerns.  Patient felt comfortable going home.               Medical Decision Making      Disposition and Plan     Clinical Impression:  1. Acute diverticulitis         Disposition:  Discharge  1/17/2024 12:22 am    Follow-up:  Sebastien Huang MD  [address] [place] [address]  [phone]    Follow up in 2 week(s)            Medications Prescribed:  Discharge Medication List as of 1/17/2024 12:39 AM        START taking these medications    Details   cefpodoxime 200 MG Oral Tab Take 2 tablets (400 mg total) by mouth 2 (two) times daily for 7 days., Normal, Disp-28 tablet, R-0      metRONIDAZOLE 500 MG Oral Tab Take 1 tablet (500 mg total) by mouth in the morning and 1 tablet (500 mg total) before bedtime. Do all this for 7 days., Normal, Disp-14 tablet, R-0      HYDROcodone-acetaminophen 5-325 MG Oral Tab Take 1-2 tablets by mouth every 6 (six) hours as needed for Pain., Normal, Disp-20 tablet, R-0      !! ondansetron 4 MG Oral Tablet Dispersible Take 1 tablet (4 mg total) by mouth every 8 (eight) hours as needed for Nausea., Normal, Disp-20 tablet, R-0       !! - Potential duplicate medications found. Please discuss with provider.

## 2024-01-17 NOTE — DISCHARGE INSTRUCTIONS
Next dose of antibiotics in the morning    Do not drink or drive on Norco as it may make you drowsy    Ondansetron for nausea/vomiting    Follow-up with GI specialist in 2 weeks as you will need colonoscopy in 6 weeks once everything heals

## 2025-04-19 ENCOUNTER — APPOINTMENT (OUTPATIENT)
Dept: GENERAL RADIOLOGY | Facility: HOSPITAL | Age: 50
End: 2025-04-19
Attending: EMERGENCY MEDICINE
Payer: COMMERCIAL

## 2025-04-19 ENCOUNTER — HOSPITAL ENCOUNTER (EMERGENCY)
Facility: HOSPITAL | Age: 50
Discharge: HOME | End: 2025-04-19
Attending: EMERGENCY MEDICINE
Payer: COMMERCIAL

## 2025-04-19 ENCOUNTER — APPOINTMENT (OUTPATIENT)
Dept: NUCLEAR MEDICINE | Facility: HOSPITAL | Age: 50
End: 2025-04-19
Attending: EMERGENCY MEDICINE
Payer: COMMERCIAL

## 2025-04-19 ENCOUNTER — APPOINTMENT (OUTPATIENT)
Dept: CT IMAGING | Facility: HOSPITAL | Age: 50
End: 2025-04-19
Attending: EMERGENCY MEDICINE
Payer: COMMERCIAL

## 2025-04-19 VITALS
OXYGEN SATURATION: 94 % | SYSTOLIC BLOOD PRESSURE: 126 MMHG | DIASTOLIC BLOOD PRESSURE: 98 MMHG | BODY MASS INDEX: 35.42 KG/M2 | RESPIRATION RATE: 18 BRPM | WEIGHT: 253 LBS | HEIGHT: 71 IN | HEART RATE: 57 BPM | TEMPERATURE: 98 F

## 2025-04-19 DIAGNOSIS — E87.0 HYPERNATREMIA: ICD-10-CM

## 2025-04-19 DIAGNOSIS — R07.89 CHEST WALL PAIN: Primary | ICD-10-CM

## 2025-04-19 DIAGNOSIS — Z94.0 KIDNEY TRANSPLANT RECIPIENT (HCC): ICD-10-CM

## 2025-04-19 DIAGNOSIS — N32.3 BLADDER DIVERTICULUM: ICD-10-CM

## 2025-04-19 DIAGNOSIS — E87.6 HYPOKALEMIA: ICD-10-CM

## 2025-04-19 LAB
ALBUMIN SERPL-MCNC: 4.3 G/DL (ref 3.2–4.8)
ALBUMIN/GLOB SERPL: 1.3 {RATIO} (ref 1–2)
ALP LIVER SERPL-CCNC: 72 U/L (ref 45–117)
ALT SERPL-CCNC: 17 U/L (ref 10–49)
ANION GAP SERPL CALC-SCNC: 12 MMOL/L (ref 0–18)
AST SERPL-CCNC: 27 U/L (ref ?–34)
ATRIAL RATE: 65 BPM
BASOPHILS # BLD AUTO: 0.03 X10(3) UL (ref 0–0.2)
BASOPHILS NFR BLD AUTO: 0.4 %
BILIRUB SERPL-MCNC: 0.3 MG/DL (ref 0.3–1.2)
BUN BLD-MCNC: 14 MG/DL (ref 9–23)
CALCIUM BLD-MCNC: 9.3 MG/DL (ref 8.7–10.6)
CHLORIDE SERPL-SCNC: 110 MMOL/L (ref 98–112)
CO2 SERPL-SCNC: 27 MMOL/L (ref 21–32)
CREAT BLD-MCNC: 1.15 MG/DL (ref 0.7–1.3)
D DIMER PPP FEU-MCNC: 0.56 UG/ML FEU (ref ?–0.51)
EGFRCR SERPLBLD CKD-EPI 2021: 77 ML/MIN/1.73M2 (ref 60–?)
EOSINOPHIL # BLD AUTO: 0.18 X10(3) UL (ref 0–0.7)
EOSINOPHIL NFR BLD AUTO: 2.2 %
ERYTHROCYTE [DISTWIDTH] IN BLOOD BY AUTOMATED COUNT: 15.8 %
GLOBULIN PLAS-MCNC: 3.3 G/DL (ref 2–3.5)
GLUCOSE BLD-MCNC: 108 MG/DL (ref 70–99)
HCT VFR BLD AUTO: 38.3 % (ref 39–53)
HGB BLD-MCNC: 13.4 G/DL (ref 13–17.5)
IMM GRANULOCYTES # BLD AUTO: 0.01 X10(3) UL (ref 0–1)
IMM GRANULOCYTES NFR BLD: 0.1 %
INR BLD: 0.9 (ref 0.8–1.2)
LIPASE SERPL-CCNC: 55 U/L (ref 12–53)
LYMPHOCYTES # BLD AUTO: 5.62 X10(3) UL (ref 1–4)
LYMPHOCYTES NFR BLD AUTO: 67.3 %
MCH RBC QN AUTO: 33.4 PG (ref 26–34)
MCHC RBC AUTO-ENTMCNC: 35 G/DL (ref 31–37)
MCV RBC AUTO: 95.5 FL (ref 80–100)
MONOCYTES # BLD AUTO: 0.6 X10(3) UL (ref 0.1–1)
MONOCYTES NFR BLD AUTO: 7.2 %
NEUTROPHILS # BLD AUTO: 1.91 X10 (3) UL (ref 1.5–7.7)
NEUTROPHILS # BLD AUTO: 1.91 X10(3) UL (ref 1.5–7.7)
NEUTROPHILS NFR BLD AUTO: 22.8 %
OSMOLALITY SERPL CALC.SUM OF ELEC: 309 MOSM/KG (ref 275–295)
P AXIS: 3 DEGREES
P-R INTERVAL: 216 MS
PLATELET # BLD AUTO: 290 10(3)UL (ref 150–450)
POTASSIUM SERPL-SCNC: 3.3 MMOL/L (ref 3.5–5.1)
PROT SERPL-MCNC: 7.6 G/DL (ref 5.7–8.2)
PROTHROMBIN TIME: 12.3 SECONDS (ref 11.6–14.8)
Q-T INTERVAL: 394 MS
QRS DURATION: 104 MS
QTC CALCULATION (BEZET): 409 MS
R AXIS: -51 DEGREES
RBC # BLD AUTO: 4.01 X10(6)UL (ref 4.3–5.7)
SODIUM SERPL-SCNC: 149 MMOL/L (ref 136–145)
T AXIS: 10 DEGREES
TROPONIN I SERPL HS-MCNC: <3 NG/L (ref ?–53)
VENTRICULAR RATE: 65 BPM
WBC # BLD AUTO: 8.4 X10(3) UL (ref 4–11)

## 2025-04-19 PROCEDURE — 99285 EMERGENCY DEPT VISIT HI MDM: CPT

## 2025-04-19 PROCEDURE — 85025 COMPLETE CBC W/AUTO DIFF WBC: CPT | Performed by: EMERGENCY MEDICINE

## 2025-04-19 PROCEDURE — 74176 CT ABD & PELVIS W/O CONTRAST: CPT | Performed by: EMERGENCY MEDICINE

## 2025-04-19 PROCEDURE — 83690 ASSAY OF LIPASE: CPT | Performed by: EMERGENCY MEDICINE

## 2025-04-19 PROCEDURE — 96374 THER/PROPH/DIAG INJ IV PUSH: CPT

## 2025-04-19 PROCEDURE — 85379 FIBRIN DEGRADATION QUANT: CPT | Performed by: EMERGENCY MEDICINE

## 2025-04-19 PROCEDURE — 80053 COMPREHEN METABOLIC PANEL: CPT | Performed by: EMERGENCY MEDICINE

## 2025-04-19 PROCEDURE — 93010 ELECTROCARDIOGRAM REPORT: CPT

## 2025-04-19 PROCEDURE — 96376 TX/PRO/DX INJ SAME DRUG ADON: CPT

## 2025-04-19 PROCEDURE — 71045 X-RAY EXAM CHEST 1 VIEW: CPT | Performed by: EMERGENCY MEDICINE

## 2025-04-19 PROCEDURE — 84484 ASSAY OF TROPONIN QUANT: CPT | Performed by: EMERGENCY MEDICINE

## 2025-04-19 PROCEDURE — 78582 LUNG VENTILAT&PERFUS IMAGING: CPT | Performed by: EMERGENCY MEDICINE

## 2025-04-19 PROCEDURE — 93005 ELECTROCARDIOGRAM TRACING: CPT

## 2025-04-19 PROCEDURE — 85610 PROTHROMBIN TIME: CPT | Performed by: EMERGENCY MEDICINE

## 2025-04-19 RX ORDER — HYDROCODONE BITARTRATE AND ACETAMINOPHEN 5; 325 MG/1; MG/1
1 TABLET ORAL EVERY 8 HOURS PRN
Qty: 15 TABLET | Refills: 0 | Status: SHIPPED | OUTPATIENT
Start: 2025-04-19 | End: 2025-04-24

## 2025-04-19 RX ORDER — MORPHINE SULFATE 4 MG/ML
4 INJECTION, SOLUTION INTRAMUSCULAR; INTRAVENOUS ONCE
Status: COMPLETED | OUTPATIENT
Start: 2025-04-19 | End: 2025-04-19

## 2025-04-19 RX ORDER — HYDROCODONE BITARTRATE AND ACETAMINOPHEN 5; 325 MG/1; MG/1
2 TABLET ORAL ONCE
Refills: 0 | Status: COMPLETED | OUTPATIENT
Start: 2025-04-19 | End: 2025-04-19

## 2025-04-19 RX ORDER — HYDROCODONE BITARTRATE AND ACETAMINOPHEN 5; 325 MG/1; MG/1
1 TABLET ORAL EVERY 8 HOURS PRN
Qty: 15 TABLET | Refills: 0 | Status: SHIPPED | OUTPATIENT
Start: 2025-04-19 | End: 2025-04-19

## 2025-04-19 NOTE — ED PROVIDER NOTES
Patient Seen in: [facility] Emergency Department      History     Chief Complaint   Patient presents with    Pain    Difficulty Breathing     Stated Complaint: Ambulated to ER with1.5 days of right sided side pain. +SOB +dizziness    Subjective:   50-year-old male, history of left-sided nephrectomy remotely for renal cancer, right-sided kidney transplant, at [facility], presents with right lateral chest wall pain, right upper quadrant/lateral abdominal pain, started yesterday.  Has some pleurisy.  No cough.  No fever.  No trauma.  Tenderness to palpation.  No nausea vomiting.  No diarrhea constipation.          History of Present Illness               Objective:     Past Medical History:    Essential hypertension    Gout    High blood pressure    Renal disorder    ESRD on dialysis    Visual impairment    GLASSES              Past Surgical History:   Procedure Laterality Date    Appendectomy      Dialysis access system Right 06/2017    Other  1983    wrist fx                Social History     Socioeconomic History    Marital status:    Tobacco Use    Smoking status: Never    Smokeless tobacco: Never   Vaping Use    Vaping status: Never Used   Substance and Sexual Activity    Alcohol use: No    Drug use: No                                Physical Exam     ED Triage Vitals   BP 04/19/25 0514 (!) 145/103   Pulse 04/19/25 0512 68   Resp 04/19/25 0512 18   Temp 04/19/25 0512 97.7 °F (36.5 °C)   Temp src 04/19/25 0512 Temporal   SpO2 04/19/25 0512 100 %   O2 Device 04/19/25 0512 None (Room air)       Current Vitals:   Vital Signs  BP: (!) 136/97  Pulse: 57  Resp: 18  Temp: 97.7 °F (36.5 °C)  Temp src: Temporal  MAP (mmHg): (!) 108    Oxygen Therapy  SpO2: 94 %  O2 Device: None (Room air)        Physical Exam  Vitals and nursing note reviewed.   HENT:      Head: Normocephalic.   Cardiovascular:      Rate and Rhythm: Normal rate.      Heart sounds: Normal heart sounds.   Pulmonary:      Effort: Pulmonary effort is  normal.   Chest:      Chest wall: Tenderness present. No crepitus.   Abdominal:      Palpations: Abdomen is soft.      Tenderness: There is abdominal tenderness.   Musculoskeletal:      Cervical back: Normal range of motion and neck supple.   Neurological:      General: No focal deficit present.      Mental Status: He is alert.   Psychiatric:         Mood and Affect: Mood is anxious.       Some reproducible tenderness in the right lateral inferior ribs, abdomen the right flank is well mobile along the right lateral abdomen as well.  No right upper quadrant pain.  No right lower quadrant pain.  No periumbilical pain.  No bruises, crepitus, rashes.  Lung sounds slightly diminished at bilateral bases, has limited splinting.  No obvious wheezes rales or rhonchi noted  Physical Exam                ED Course     Labs Reviewed   COMP METABOLIC PANEL (14) - Abnormal; Notable for the following components:       Result Value    Glucose 108 (*)     Sodium 149 (*)     Potassium 3.3 (*)     Calculated Osmolality 309 (*)     All other components within normal limits   CBC WITH DIFFERENTIAL WITH PLATELET - Abnormal; Notable for the following components:    RBC 4.01 (*)     HCT 38.3 (*)     Lymphocyte Absolute 5.62 (*)     All other components within normal limits   LIPASE - Abnormal; Notable for the following components:    Lipase 55 (*)     All other components within normal limits   D-DIMER - Abnormal; Notable for the following components:    D-Dimer 0.56 (*)     All other components within normal limits   TROPONIN I HIGH SENSITIVITY - Normal   PROTHROMBIN TIME (PT) - Normal     EKG    Rate, intervals and axes as noted on EKG Report.  Rate: 65  Rhythm: Sinus Rhythm  Reading: EKG sinus rhythm 65 bpm.  Left axis deviation.  No ST elevations.  First-degree AV block with WA interval of 216 ms.  Inverted T wave in lead III.  When compared to September 2019, no significant changes noted    Patient placed on cardiac monitor for  telemetry monitoring secondary to sob. Interpretation at bedside by me is sinus rhythm.                Results                                 MDM      NM LUNG VQ VENT / PERFUSION SCAN  (CPT=78582)  Result Date: 4/19/2025  CONCLUSION:  Normal V/Q scan.   LOCATION:  [facility]   Dictated by (CST): Zeke Beckford MD on 4/19/2025 at 9:54 AM     Finalized by (CST): Zeke Beckford MD on 4/19/2025 at 9:54 AM       CT ABDOMEN+PELVIS(CPT=74176)  Result Date: 4/19/2025  CONCLUSION:   1. A definite acute inflammatory process is not identified.  Diverticulosis of the colon without evidence of acute diverticulitis.  2. Right pelvic renal transplant is again noted.  No hydronephrosis.  However, there is prominence of a tubular fluid-filled structure that is adjacent to the junction of the right renal transplant ureter and bladder.  This may represent a developing bladder diverticulum.  This may also represent mild dilatation of the distal right renal transplant ureter.  Agree with preliminary radiology report from [facility] radiology.     LOCATION:  [facility]   Dictated by (CST): Zeke Beckford MD on 4/19/2025 at 8:17 AM     Finalized by (CST): Zeke Beckford MD on 4/19/2025 at 8:24 AM       XR CHEST AP PORTABLE  (CPT=71045)  Result Date: 4/19/2025  CONCLUSION: No acute cardiopulmonary abnormality.   LOCATION:  [facility]      Dictated by (CST): Jadon Powell MD on 4/19/2025 at 7:18 AM     Finalized by (CST): Jadon Powell MD on 4/19/2025 at 7:19 AM         I independently read the CT abdomen pelvis did not have any obvious signs of obstructive process Dr. The radiologist regarding the lung bases well and the bony findings.  No obvious rib fracture.  No obvious pulmonary infarct    External chart review demonstrates outpatient visit with Leo    Differential diagnosis includes, but limited to, muscle spasm, strain, transplant issue, bowel obstruction, gas pain, PE    Wife over the phone helpful regarding history presenting  illness    50-year-old male presents with some reproducible tenderness to the right lateral ribs.  No falls.  No trauma.  No bruising.  No crepitus.  No rashes.  Also worse with twisting motion.  No right upper quadrant pain no right lower quadrant pain, abdomen is benign overall.  CT findings as above.  Discussed limited bladder diverticulum suspected.  No hydronephrosis.  V/Q negative.  Minimal elevation D-dimer.  Troponin is negative.  Discussed everything with him.  Given some Norco, feels much better.  Further workup offer considered discussed and declined at this time.  He wants to go home.  Wife is coming to pick him up.  Norco as needed.  Follow-up with his transplant team and his PCP as an outpatient.  Discussed everything with him at length in detail.  Verbalized understanding, shared decision making utilized, return precaution provided, discharged home at his request after discussion of risk, benefits, alternatives    Patient was screened and evaluated during this visit.  As the treating physician attending to the patient, I determined within reasonable clinical confidence and prior to discharge, that an emergency medical condition was not or was no longer present.  There was no indication for further evaluation, treatment, or admission on an emergency basis.  Comprehensive verbal and written discharge and follow-up instructions were provided to help prevent relapse or worsening.  Patient was instructed to follow-up with their primary care provider for further evaluation and treatment, return immediately to ER for worsening, concerning, new, or changing/persisting symptoms. I discussed the case with the patient and they had no questions, complaints, or concerns.  Patient was comfortable going home.     Per the discharge paperwork, patients are encouraged to and given instructions on how to sign [facility], where they have access to their records, including any/all incidental findings.     This note  was prepared using Dragon Medical voice recognition dictation software. As a result errors may occur. When identified these errors have been corrected. While every attempt is made to correct errors during dictation discrepancies may still exist    Note to patient: The 21st Century Cures Act makes medical notes like these available to patients in the interest of transparency. However, this is a medical document intended as peer to peer communication. It is written in medical language and may contain abbreviations or verbiage that are unfamiliar. It may appear blunt or direct. Medical documents are intended to carry relevant information, facts as evident, and the clinical opinion of the practitioner.         Medical Decision Making      Disposition and Plan     Clinical Impression:  1. Chest wall pain    2. Kidney transplant recipient (HCC)    3. Bladder diverticulum    4. Hypokalemia    5. Hypernatremia         Disposition:  Discharge  4/19/2025 11:16 am    Follow-up:  Samina Mclaughlin MD  [address] [place] [id number]  [phone]    Follow up            Medications Prescribed:  Current Discharge Medication List        START taking these medications    Details   HYDROcodone-acetaminophen 5-325 MG Oral Tab Take 1 tablet by mouth every 8 (eight) hours as needed for Pain.  Qty: 15 tablet, Refills: 0    Associated Diagnoses: Chest wall pain      Naloxone HCl 4 MG/0.1ML Nasal Liquid 4 mg by Nasal route as needed. If patient remains unresponsive, repeat dose in other nostril 2-5 minutes after first dose.  Qty: 1 kit, Refills: 0             Supplementary Documentation:

## 2025-04-19 NOTE — ED QUICK NOTES
Rounding Completed    Plan of Care reviewed. Waiting for Nuc med scan.  Elimination needs assessed.  Provided juice, lights off, and door closed..    Bed is locked and in lowest position. Call light within reach.

## 2025-04-19 NOTE — ED QUICK NOTES
Rounding Completed  Introduced self to patient    Plan of Care reviewed. Waiting for scan.    Bed is locked and in lowest position. Call light within reach.

## 2025-04-25 ENCOUNTER — APPOINTMENT (OUTPATIENT)
Dept: CT IMAGING | Facility: HOSPITAL | Age: 50
End: 2025-04-25
Attending: EMERGENCY MEDICINE
Payer: COMMERCIAL

## 2025-04-25 ENCOUNTER — HOSPITAL ENCOUNTER (EMERGENCY)
Facility: HOSPITAL | Age: 50
Discharge: HOME | End: 2025-04-25
Attending: EMERGENCY MEDICINE
Payer: COMMERCIAL

## 2025-04-25 VITALS
HEART RATE: 88 BPM | DIASTOLIC BLOOD PRESSURE: 78 MMHG | SYSTOLIC BLOOD PRESSURE: 133 MMHG | HEIGHT: 71 IN | RESPIRATION RATE: 18 BRPM | TEMPERATURE: 98 F | BODY MASS INDEX: 33.6 KG/M2 | OXYGEN SATURATION: 99 % | WEIGHT: 240 LBS

## 2025-04-25 DIAGNOSIS — R07.89 CHEST WALL PAIN: Primary | ICD-10-CM

## 2025-04-25 PROCEDURE — 71250 CT THORAX DX C-: CPT | Performed by: EMERGENCY MEDICINE

## 2025-04-25 PROCEDURE — 99284 EMERGENCY DEPT VISIT MOD MDM: CPT

## 2025-04-25 RX ORDER — HYDROCODONE BITARTRATE AND ACETAMINOPHEN 5; 325 MG/1; MG/1
2 TABLET ORAL ONCE
Refills: 0 | Status: COMPLETED | OUTPATIENT
Start: 2025-04-25 | End: 2025-04-25

## 2025-04-25 RX ORDER — HYDROCODONE BITARTRATE AND ACETAMINOPHEN 5; 325 MG/1; MG/1
1-2 TABLET ORAL EVERY 6 HOURS PRN
Qty: 12 TABLET | Refills: 0 | Status: SHIPPED | OUTPATIENT
Start: 2025-04-25 | End: 2025-05-02

## 2025-04-25 NOTE — ED INITIAL ASSESSMENT (HPI)
Pt arrives to ED for evaluation of right rib pain, pt was here on 4/19 for same issue, pt states the pain is getting worse. Pain relievers are no working. Pt feeling SOB. Pt denies injury or trauma. Pt states the right rib area is tender to touch if touched with pressure.

## 2025-04-25 NOTE — ED PROVIDER NOTES
Patient Seen in: [facility] Emergency Department      History     Chief Complaint   Patient presents with    Pain     Stated Complaint: Poss rib fx to R side, seen on 4/19    Subjective:   HPI    This is a 50-year-old gentleman here for evaluation of severe right lateral chest wall pain.  He was seen in the ER 6 days ago, had a VQ scan of the chest rule out pulmonary embolism, CT abdomen pelvis that showed no acute intra-abdominal abnormalities.  Patient has a history of kidney transplant.  He denies any falls injuries fevers cough cold symptoms new rash or any other complaints or concerns.  States he thought the pain was improving however it has persisted.  No other complaints or concerns states he ran out of pain medication as well.  History of Present Illness               Objective:     Past Medical History:    Essential hypertension    Gout    High blood pressure    Renal disorder    ESRD on dialysis    Visual impairment    GLASSES              Past Surgical History:   Procedure Laterality Date    Appendectomy      Dialysis access system Right 06/2017    Other  1983    wrist fx                Social History     Socioeconomic History    Marital status:    Tobacco Use    Smoking status: Never    Smokeless tobacco: Never   Vaping Use    Vaping status: Never Used   Substance and Sexual Activity    Alcohol use: No    Drug use: No                                Physical Exam     ED Triage Vitals [04/25/25 1743]   /80   Pulse 68   Resp 20   Temp 98 °F (36.7 °C)   Temp src Temporal   SpO2 95 %   O2 Device None (Room air)       Current Vitals:   Vital Signs  BP: 135/88  Pulse: 67  Resp: 18  Temp: 98 °F (36.7 °C)  Temp src: Temporal  MAP (mmHg): (!) 102    Oxygen Therapy  SpO2: 99 %  O2 Device: None (Room air)        Physical Exam      Physical Exam  Vitals signs and nursing note reviewed.   General:  Patient laying supine in the bed in no acute distress  Head: Normocephalic and atraumatic.   HEENT:   Mucous membranes are moist.   Cardiovascular:  Normal rate and regular rhythm.  No Edema  Chest wall: No rash noted, there is severe right lateral chest wall tenderness to even light palpation just below the nipple line.  Pulmonary:  Pulmonary effort is normal.  Normal breath sounds. No wheezing, rhonchi or rales.   Abdominal: Soft nontender nondistended, normal bowel sounds, no guarding no rebound tenderness  Skin: Warm and dry  Neurological: Awake alert, speech is normal, stable gait      Physical Exam                ED Course   Labs Reviewed - No data to display       Results            CT CHEST (CPT=71250)  Result Date: 4/25/2025  PROCEDURE:  CT CHEST (CPT=71250)  COMPARISON:  None.  INDICATIONS:  Severe right lateral chest wall pain, history of kidney transplant  TECHNIQUE:  Unenhanced multislice CT scanning is performed through the chest.  Dose reduction techniques were used. Dose information is transmitted to the ACR (American College of Radiology) NRDR (National Radiology Data Registry) which includes the Dose  Index Registry.  PATIENT STATED HISTORY: (As transcribed by Technologist)  severe right lateral chest wall pain.  history of kidney transplant in 2020.    FINDINGS:  This scan performed without IV contrast, with limitations thereof.  LUNGS/PLLEURA:  No active or acute process seen.  No sign of pneumonia, pleural effusion, pneumothorax, effusion. Small fissure based nodule having appearance most typical for benign intrapulmonary lymph node is present, located within the left major interlobar fissure as well as in the right minor fissure.  THORACIC AORTA:  No aneurysm or other acute process.  MEDIASTINUM/STEFAN:  Unremarkable.  CARDIAC:  Unremarkable.  CHEST WALL:  Unremarkable.  LIMITED ABDOMEN:  Partially visualized atrophied native right kidney in this patient with renal transplant, along with a right renal cyst.  No left kidney identified.  BONES:  No acute process.  No fracture.  No lytic or  otherwise destructive or active appearing bone lesion.  No active appearing chest wall abnormality.  OTHER:    None.             CONCLUSION:  No CT findings to explain etiology of right sided chest pain.  LOCATION:  [id number]   Dictated by (CST): Jose Davis MD on 4/25/2025 at 7:40 PM     Finalized by (CST): Jose Davis MD on 4/25/2025 at 7:43 PM       NM LUNG VQ VENT / PERFUSION SCAN  (CPT=78582)  Result Date: 4/19/2025  PROCEDURE:  NM LUNG VQ VENT / PERFUSION SCAN  (CPT=78582)  COMPARISON:  [facility] , XR, XR CHEST AP PORTABLE  (CPT=71045), 4/19/2025, 6:26 AM.  INDICATIONS:  eval for PE  TECHNIQUE:  The patient was ventilated with 5.0 mCi Xe-133 gas and posterior supine images of the lungs were obtained. This was followed by IV injection of 5.6 mCi Tc-99m MAA, and similar projection images were obtained.  FINDINGS:  PERFUSION:    Uniform. REGIONS OF MISMATCH:  None.            CONCLUSION:  Normal V/Q scan.   LOCATION:  [facility]   Dictated by (CST): Zeke Beckford MD on 4/19/2025 at 9:54 AM     Finalized by (CST): Zeke Beckford MD on 4/19/2025 at 9:54 AM       CT ABDOMEN+PELVIS(CPT=74176)  Result Date: 4/19/2025  PROCEDURE:  CT ABDOMEN+PELVIS (CPT=74176)  COMPARISON:  [facility] , CT, CT ABDOMEN+PELVIS(CPT=74176), 1/16/2024, 11:07 PM.  INDICATIONS:  R flank/lateral pain. h/o L nephrectomy, R renal transplant  TECHNIQUE:  Unenhanced multislice CT scanning was performed from the dome of the diaphragm to the pubic symphysis.  Dose reduction techniques were used. Dose information is transmitted to the ACR (American College of Radiology) NRDR (National Radiology Data Registry) which includes the Dose Index Registry.  PATIENT STATED HISTORY: (As transcribed by Technologist)   R flank/lateral pain. h/o L nephrectomy, R renal transplant     FINDINGS:  LIVER:  No enlargement, atrophy, abnormal density, or significant focal lesion.  BILIARY:  No visible dilatation or calcification.  PANCREAS:  No lesion, fluid  collection, ductal dilatation, or atrophy.  SPLEEN:  No enlargement or focal lesion.  KIDNEYS:  Left kidney is absent.  Native right kidney is markedly atrophic.  Right pelvic renal transplant is noted.  Punctate renal parenchymal calcification in the transplant kidney is noted.  No obstructive calculi.  No hydronephrosis.  Minimal infiltration of the fat adjacent to the renal transplant is of questionable significance.  There is a tubular fluid-filled structure adjacent to the right-side of the bladder and the junction of the right renal transplant ureter and bladder.  This measures 5.0 x 1.6 cm on image 81. ADRENALS:  No mass or enlargement.  AORTA/VASCULAR:    Unremarkable as seen on non-contrast imaging. RETROPERITONEUM:  No mass or adenopathy.  BOWEL/MESENTERY:  Diverticulosis of the colon without evidence of acute diverticulitis.  There is suggestion of possible prior appendectomy. ABDOMINAL WALL:  Infiltration of the left abdominal wall/subcutaneous fat is stable.  This region measures 3.1 x 2.5 cm (image 57). URINARY BLADDER:  As above PELVIC NODES:  No adenopathy.  PELVIC ORGANS:  No visible mass.  Pelvic organs appropriate for patient age.  BONES:  No bony lesion or fracture.  LUNG BASES:  No visible pulmonary or pleural disease.  OTHER:  Negative.             CONCLUSION:   1. A definite acute inflammatory process is not identified.  Diverticulosis of the colon without evidence of acute diverticulitis.  2. Right pelvic renal transplant is again noted.  No hydronephrosis.  However, there is prominence of a tubular fluid-filled structure that is adjacent to the junction of the right renal transplant ureter and bladder.  This may represent a developing bladder diverticulum.  This may also represent mild dilatation of the distal right renal transplant ureter.  Agree with preliminary radiology report from [facility] radiology.     LOCATION:  [facility]   Dictated by (CST): Zeke Beckford MD on 4/19/2025 at 8:17 AM      Finalized by (CST): Zeke Beckford MD on 4/19/2025 at 8:24 AM       XR CHEST AP PORTABLE  (CPT=71045)  Result Date: 4/19/2025  PROCEDURE:  XR CHEST AP PORTABLE  (CPT=71045)  TECHNIQUE:  AP chest radiograph was obtained.  COMPARISON:  [facility] , XR, XR RIBS WITH CHEST (3 VIEWS), LEFT  (CPT=71101), 10/08/2023, 8:41 AM.  INDICATIONS:  Ambulated to ER with1.5 days of right sided side pain. +SOB +dizziness, Denied trauma. Speaking in full sentences., 66 HR, 95% RA  PATIENT STATED HISTORY: (As transcribed by Technologist)  Patient complains of right side chest pain with some shortness of breath x 2 days.   FINDINGS:  The cardiomediastinal silhouette is prominent which may be due to technique.  There is no consolidation, effusion, or pneumothorax.  No aggressive osseous lesions are identified.            CONCLUSION: No acute cardiopulmonary abnormality.   LOCATION:  [facility]      Dictated by (CST): Jadon Powell MD on 4/19/2025 at 7:18 AM     Finalized by (CST): Jadon Powell MD on 4/19/2025 at 7:19 AM                            MDM   This is a 50-year-old gentleman here for evaluation of severe right lateral chest wall pain, reports no trauma.  Differential includes musculoskeletal chest wall pain, rib fracture.  Will perform a CT of the chest to evaluate the lung parenchyma as well as the bony structures, provide pain medications reevaluate patient is afebrile hemodynamically stable, O2 saturation on room air is 99%.  Heart rate in the 60s.    I reviewed patient's results from recent ED visit 6 days ago, negative VQ scan, CT abdomen pelvis no acute abnormality.  CT chest noncontrast was performed, shows no abnormalities of lung parenchyma, the bones show no acute fracture.  No other acute abnormality seen.  Patient has no rash on exam though it is possible he may have the prodrome of shingles, recommend continued supportive care at home follow-up with his PMD, pain medications as needed return precautions discussed he  is in agreement plan.    I independently viewed and interpreted the following imaging: CT chest shows no right-sided rib fractures, no lung parenchymal abnormalities  Medical Decision Making      Disposition and Plan     Clinical Impression:  1. Chest wall pain         Disposition:  Discharge  4/25/2025  8:28 pm    Follow-up:  Samina Mclaughlin MD  [address] [place] [id number]  [phone]    Follow up  Follow-up with your PMD for reevaluation in 24 to 48 hours.  Return to ER if symptoms worsen or change or if any other new concerns.          Medications Prescribed:  Current Discharge Medication List          Supplementary Documentation:

## 2025-06-23 ENCOUNTER — HOSPITAL ENCOUNTER (EMERGENCY)
Facility: HOSPITAL | Age: 50
Discharge: HOME | End: 2025-06-23
Attending: EMERGENCY MEDICINE
Payer: COMMERCIAL

## 2025-06-23 ENCOUNTER — APPOINTMENT (OUTPATIENT)
Dept: GENERAL RADIOLOGY | Facility: HOSPITAL | Age: 50
End: 2025-06-23
Attending: EMERGENCY MEDICINE
Payer: COMMERCIAL

## 2025-06-23 VITALS
RESPIRATION RATE: 17 BRPM | OXYGEN SATURATION: 96 % | DIASTOLIC BLOOD PRESSURE: 90 MMHG | HEART RATE: 71 BPM | SYSTOLIC BLOOD PRESSURE: 128 MMHG | TEMPERATURE: 98 F

## 2025-06-23 DIAGNOSIS — S43.101A SEPARATION OF RIGHT ACROMIOCLAVICULAR JOINT, INITIAL ENCOUNTER: Primary | ICD-10-CM

## 2025-06-23 PROCEDURE — 99284 EMERGENCY DEPT VISIT MOD MDM: CPT

## 2025-06-23 PROCEDURE — 73030 X-RAY EXAM OF SHOULDER: CPT | Performed by: EMERGENCY MEDICINE

## 2025-06-23 PROCEDURE — 99283 EMERGENCY DEPT VISIT LOW MDM: CPT

## 2025-06-23 RX ORDER — HYDROCODONE BITARTRATE AND ACETAMINOPHEN 5; 325 MG/1; MG/1
1-2 TABLET ORAL EVERY 6 HOURS PRN
Qty: 10 TABLET | Refills: 0 | Status: SHIPPED | OUTPATIENT
Start: 2025-06-23 | End: 2025-06-30

## 2025-06-23 RX ORDER — HYDROCODONE BITARTRATE AND ACETAMINOPHEN 5; 325 MG/1; MG/1
1 TABLET ORAL ONCE
Refills: 0 | Status: COMPLETED | OUTPATIENT
Start: 2025-06-23 | End: 2025-06-23

## 2025-06-23 NOTE — ED PROVIDER NOTES
Patient Seen in: [facility] Emergency Department        History  Chief Complaint   Patient presents with   • Arm or Hand Injury     Stated Complaint: fall, right shoudler pain    Subjective:   HPI            50-year-old male presenting with right shoulder pain.  Patient says he was carrying some dishes when he slipped landing directly on his right shoulder is having pain since that time denies head injury or any other exacerbating relieving factors associate symptoms      Objective:     Past Medical History:   • Essential hypertension   • Gout   • High blood pressure   • Renal disorder    ESRD on dialysis   • Visual impairment    GLASSES              Past Surgical History:   Procedure Laterality Date   • Appendectomy     • Dialysis access system Right 06/2017   • Other  1983    wrist fx                Social History     Socioeconomic History   • Marital status:    Tobacco Use   • Smoking status: Never   • Smokeless tobacco: Never   Vaping Use   • Vaping status: Never Used   Substance and Sexual Activity   • Alcohol use: No   • Drug use: No                                Physical Exam    ED Triage Vitals   BP 06/23/25 0206 128/90   Pulse 06/23/25 0200 71   Resp 06/23/25 0200 17   Temp 06/23/25 0200 98 °F (36.7 °C)   Temp src --    SpO2 06/23/25 0200 96 %   O2 Device 06/23/25 0206 None (Room air)       Current Vitals:   Vital Signs  BP: 128/90  Pulse: 71  Resp: 17  Temp: 98 °F (36.7 °C)    Oxygen Therapy  SpO2: 96 %  O2 Device: None (Room air)            Physical Exam     Awake alert patient appears no no distress HEENT exam is normal lungs are clear cardiovascular exam regular rhythm abdomen soft nontender extremities no Cyanosis or edema no rash back exam is normal skin is nondiaphoretic tenderness over the anterior head of the deltoid right upper extremity full range of motion strength 5 out of 5      ED Course  Labs Reviewed - No data to display       Differential diagnosis includes open fracture,  dislocation                  MDM             Medical Decision Making  50-year-old male presenting with right shoulder pain status post fall.  Patient was treated with Norco for breakthrough pain here x-rays obtained.  Independent rotation by ED physician of right shoulder shows possible AC separation.  Patient was placed in sling for comfort placed on Norco for breakthrough pain is referred to orthopedics and is to return to the emergency department for worsening symptoms other complaints  The patient was screened and evaluated during this visit.  As a treating physician attending to the patient, I determined, within reasonable clinical confidence and prior to discharge, that an emergency medical condition was not or was no longer present.  There was no indication for further evaluation, treatment or admission on an emergency basis.    The usual and customary discharge instructions were discussed given the patient's ER course.  We discussed signs and symptoms that should prompt the patient's immediate return to the emergency department.  Reasonable over-the-counter and prescription treatment options and physician follow-up plan was discussed.  Patient was discharged home in good condition  This note was prepared using Dragon Medical voice recognition dictation software.  As a result errors may occur.  When identified to these areas have been corrected.  While every attempt is made to correct errors during dictation discrepancies may still exist.  Please contact if there are any errors    Problems Addressed:  Separation of right acromioclavicular joint, initial encounter: acute illness or injury    Amount and/or Complexity of Data Reviewed  Radiology: ordered and independent interpretation performed. Decision-making details documented in ED Course.  ECG/medicine tests: ordered and independent interpretation performed. Decision-making details documented in ED Course.        Disposition and Plan     Clinical  Impression:  1. Separation of right acromioclavicular joint, initial encounter         Disposition:  Discharge  6/23/2025  3:16 am    Follow-up:  Geoff Mina MD  [address]  [address] [address]  [phone]    Follow up in 1 week(s)            Medications Prescribed:  Discharge Medication List as of 6/23/2025  3:23 AM                Supplementary Documentation:

## 2025-07-01 ENCOUNTER — HOSPITAL ENCOUNTER (EMERGENCY)
Facility: HOSPITAL | Age: 50
Discharge: HOME | End: 2025-07-01
Attending: EMERGENCY MEDICINE
Payer: COMMERCIAL

## 2025-07-01 VITALS
HEART RATE: 70 BPM | WEIGHT: 230 LBS | RESPIRATION RATE: 16 BRPM | OXYGEN SATURATION: 98 % | TEMPERATURE: 99 F | DIASTOLIC BLOOD PRESSURE: 72 MMHG | SYSTOLIC BLOOD PRESSURE: 158 MMHG | HEIGHT: 71 IN | BODY MASS INDEX: 32.2 KG/M2

## 2025-07-01 DIAGNOSIS — S43.101D SEPARATION OF RIGHT ACROMIOCLAVICULAR JOINT, SUBSEQUENT ENCOUNTER: Primary | ICD-10-CM

## 2025-07-01 PROCEDURE — 96372 THER/PROPH/DIAG INJ SC/IM: CPT

## 2025-07-01 PROCEDURE — 99283 EMERGENCY DEPT VISIT LOW MDM: CPT

## 2025-07-01 PROCEDURE — 99282 EMERGENCY DEPT VISIT SF MDM: CPT

## 2025-07-01 RX ORDER — KETOROLAC TROMETHAMINE 30 MG/ML
30 INJECTION, SOLUTION INTRAMUSCULAR; INTRAVENOUS ONCE
Status: COMPLETED | OUTPATIENT
Start: 2025-07-01 | End: 2025-07-01

## 2025-07-01 RX ORDER — HYDROCODONE BITARTRATE AND ACETAMINOPHEN 5; 325 MG/1; MG/1
1 TABLET ORAL EVERY 6 HOURS PRN
Qty: 12 TABLET | Refills: 0 | Status: SHIPPED | OUTPATIENT
Start: 2025-07-01

## 2025-07-01 NOTE — DISCHARGE INSTRUCTIONS
Use sling at home  Ice to areas of pain at home  Return to the ER if symptoms worsen or if any other problems arise

## 2025-07-01 NOTE — ED QUICK NOTES
Pt reevaluated by er physician, pt informed of his plan of care and AVS,pt verbalizing understanding

## 2025-07-01 NOTE — ED INITIAL ASSESSMENT (HPI)
Patient to ED with c/o right shoulder pain. Was dx with torn ligament however stated he is unable to schedule physical therapy and only received a few pain pills. States OTC meds are not working

## 2025-07-01 NOTE — ED PROVIDER NOTES
Patient Seen in: [facility] Emergency Department        History  Chief Complaint   Patient presents with    Shoulder Pain     Stated Complaint: Right shoulder pain. Was seen 6/23 for same. Pt states he was diagnosed with a *    Subjective:   HPI            Patient is a 50-year-old left-hand-dominant male presents emergency room with a history of suffering an AC separation of his right shoulder about a week ago was seen here in the emergency room for similar symptoms at that time was referred to outpatient orthopedics.  The patient states he was given a sling but the sling \"ripped\".  The patient states he does not have any pain medication at this time that he is taking besides over-the-counter medication.  The patient has been taking ibuprofen for pain at home.  Patient denies history of any additional fall or trauma.  The patient's pain is well localized to the right shoulder only at this time.  Patient's pain is worse when he lifts his right arm upwards.  Patient denies history of any other complaints of discomfort at this time.  Patient requesting pain medication for home.      Objective:     No pertinent past medical history.            No pertinent past surgical history.              No pertinent social history.                              Physical Exam    ED Triage Vitals [07/01/25 0642]   BP (!) 164/71   Pulse 79   Resp 20   Temp 98.6 °F (37 °C)   Temp src Oral   SpO2 95 %   O2 Device None (Room air)       Current Vitals:   Vital Signs  BP: (!) 164/71  Pulse: 79  Resp: 20  Temp: 98.6 °F (37 °C)  Temp src: Oral    Oxygen Therapy  SpO2: 95 %  O2 Device: None (Room air)            Physical Exam     GENERAL: Well-developed, well-nourished male sitting up breathing easily in no apparent distress.  Patient is nontoxic in appearance.  NECK: There is no focal tenderness to palpation appreciated.   EXTREMITIES: There is no cyanosis, clubbing, or edema appreciated. Pulses are 2+ and equal in both upper  extremities.  There is tenderness to palpation over the AC joint and superior aspect of the right shoulder only.  There is no deformity of the right shoulder appreciated.  There is pain with passive abduction of the right shoulder appreciated.  There is no other focal tenderness throughout the right upper extremity appreciated.  There is no pain with passive range of motion at the right elbow or right wrist.  NEURO: Patient is awake, alert and oriented to time place and person. Motor strength is 5 over 5 in all joints of the right upper extremity.  Patient ambulatory in the ER and answering all questions appropriately.  Patient with no sensory deficit appreciated in the right upper extremity.          ED Course  Labs Reviewed - No data to display                           MDM     I reviewed the patient's x-ray report from 6/23 which showed evidence of a widened AC joint consistent with shoulder separation.  Patient has not been using a sling and only taking over-the-counter medication for pain at this time.        Medical Decision Making    Patient given IM Toradol for symptoms here in the emergency room.  The patient given prescription for Norco here in the ER.  Patient placed into a sling at this time which she was instructed to use at home.  Instructions to ice and rest areas of pain at home to take Norco as needed for discomfort follow-up with PT and orthopedic surgery as previously scheduled return to the ER immediately if symptoms worsen or if any other problems arise.  Patient discharged to home at this time.  Disposition and Plan     Clinical Impression:  1. Separation of right acromioclavicular joint, subsequent encounter         Disposition:  Discharge  7/1/2025  7:09 am    Follow-up:  Samina Mclaughlin MD  [address] [place] [id number]  [phone]    Follow up in 2 day(s)  also follow up with ortho as previously scheduled          Medications Prescribed:  Current Discharge Medication  List                Supplementary Documentation:

## (undated) DEVICE — TUBING CYSTO TUR DUAL

## (undated) DEVICE — DEVICE PORT SITE CLOSURE

## (undated) DEVICE — LAP CHOLE/APPY CDS-LF: Brand: MEDLINE INDUSTRIES, INC.

## (undated) DEVICE — SUTURE VICRYL 0 UR-6

## (undated) DEVICE — BANDAID COVERLET 1X3

## (undated) DEVICE — DISPOSABLE, UNIPOLAR, BOVIE PLUG TO RIGHT ANGLE SINGLE PIN: Brand: QUANTUM

## (undated) DEVICE — TROCAR: Brand: KII FIOS FIRST ENTRY

## (undated) DEVICE — STAY.SAFE® CAP: Brand: STAY.SAFE®

## (undated) DEVICE — KENDALL SCD EXPRESS SLEEVES, KNEE LENGTH, MEDIUM: Brand: KENDALL SCD

## (undated) DEVICE — VISUALIZATION SYSTEM: Brand: CLEARIFY

## (undated) DEVICE — CATH PACK Y-TEC

## (undated) DEVICE — 3M™ IOBAN™ 2 ANTIMICROBIAL INCISE DRAPE 6650EZ: Brand: IOBAN™ 2

## (undated) DEVICE — CHLORAPREP 26ML APPLICATOR

## (undated) DEVICE — SOL  .9 1000ML BTL

## (undated) DEVICE — SUTURE NUROLON 0 C545G

## (undated) DEVICE — SUTURE VICRYL 5-0 P-3

## (undated) DEVICE — TROCAR: Brand: KII SHIELDED BLADED ACCESS SYSTEM

## (undated) DEVICE — TISSUE RETRIEVAL SYSTEM: Brand: INZII RETRIEVAL SYSTEM

## (undated) DEVICE — DRAPE SHEET LAPCHOLE 124X100X7

## (undated) DEVICE — SOL  .9 3000ML

## (undated) DEVICE — E-Z CLEAN, NON-STICK, PTFE COATED, ELECTROSURGICAL BLADE ELECTRODE, MODIFIED EXTENDED INSULATION, 2.5 INCH (6.35 CM): Brand: MEGADYNE

## (undated) DEVICE — ENDOPATH ULTRA VERESS INSUFFLATION NEEDLES WITH LUER LOCK CONNECTORS: Brand: ENDOPATH

## (undated) DEVICE — MONOFILAMENT ABSORBABLE SUTURE: Brand: MAXON

## (undated) DEVICE — SUTURE VICRYL 0 J906G

## (undated) DEVICE — PLUMEPORT ACTIV LAPAROSCOPIC SMOKE FILTRATION DEVICE: Brand: PLUMEPORT ACTIVE

## (undated) DEVICE — LIGASURE LAP MARYLAND 37CM

## (undated) DEVICE — STERILE LATEX POWDER-FREE SURGICAL GLOVESWITH NITRILE COATING: Brand: PROTEXIS

## (undated) DEVICE — 3M(TM) TEGADERM(TM) TRANSPARENT FILM DRESSING FRAME STYLE 1628: Brand: 3M™ TEGADERM™

## (undated) DEVICE — Device

## (undated) DEVICE — STERILE POLYISOPRENE POWDER-FREE SURGICAL GLOVES: Brand: PROTEXIS

## (undated) DEVICE — SUTURE MONOCRYL 4-0 PS-2

## (undated) DEVICE — THE ECHELON FLEX POWERED PLUS ARTICULATING ENDOSCOPIC LINEAR CUTTERS ARE STERILE, SINGLE PATIENT USE INSTRUMENTS THAT SIMULTANEOUSLYCUT AND STAPLE TISSUE. THERE ARE SIX STAGGERED ROWS OF STAPLES, THREE ON EITHER SIDE OF THE CUT LINE. THE ECHELON FLEX 45 POWERED PLUSINSTRUMENTS HAVE A STAPLE LINE THAT IS APPROXIMATELY 45 MM LONG AND A CUT LINE THAT IS APPROXIMATELY 42 MM LONG. THE SHAFT CAN ROTATE FREELYIN BOTH DIRECTIONS AND AN ARTICULATION MECHANISM ENABLES THE DISTAL PORTION OF THE SHAFT TO PIVOT TO FACILITATE LATERAL ACCESS TO THE OPERATIVESITE.THE INSTRUMENTS ARE PACKAGED WITH A PRIMARY LITHIUM BATTERY PACK THAT MUST BE INSTALLED PRIOR TO USE. THERE ARE SPECIFIC REQUIREMENTS FORDISPOSING OF THE BATTERY PACK. REFER TO THE BATTERY PACK DISPOSAL SECTION.THE INSTRUMENTS ARE PACKAGED WITHOUT A RELOAD AND MUST BE LOADED PRIOR TO USE. A STAPLE RETAINING CAP ON THE RELOAD PROTECTS THE STAPLE LEGPOINTS DURING SHIPPING AND TRANSPORTATION. THE INSTRUMENTS’ LOCK-OUT FEATURE IS DESIGNED TO PREVENT A USED OR IMPROPERLY INSTALLED RELOADFROM BEING REFIRED OR AN INSTRUMENT FROM BEING FIRED WITHOUT A RELOAD.: Brand: ECHELON FLEX

## (undated) DEVICE — SUTURE VICRYL 0

## (undated) DEVICE — FALLER TUNNELING TROCAR: Brand: ARGYLE

## (undated) DEVICE — DERMABOND LIQUID ADHESIVE

## (undated) DEVICE — THE ECHELON, ECHELON ENDOPATH™ AND ECHELON FLEX™ FAMILIES OF ENDOSCOPIC LINEAR CUTTERS AND RELOADS ARE STERILE, SINGLE PATIENT USE INSTRUMENTS THAT SIMULTANEOUSLY CUT AND STAPLE TISSUE. THERE ARE SIX STAGGERED ROWS OF STAPLES, THREE ON EITHER SIDE OF THE CUT LINE. THE 45 MM INSTRUMENTS HAVE A STAPLE LINE THATIS APPROXIMATELY 45 MM LONG AND A CUT LINE THAT IS APPROXIMATELY 42 MM LONG. THE SHAFT CAN ROTATE FREELY IN BOTH DIRECTIONS AND AN ARTICULATION MECHANISM ON ARTICULATING INSTRUMENTS ENABLES BENDING THE DISTAL PORTIONOF THE SHAFT TO FACILITATE LATERAL ACCESS OF THE OPERATIVE SITE.THE INSTRUMENTS ARE SHIPPED WITHOUT A RELOAD AND MUST BE LOADED PRIOR TO USE. A STAPLE RETAINING CAP ON THE RELOAD PROTECTS THE STAPLE LEG POINTS DURING SHIPPING AND TRANSPORTATION. THE INSTRUMENTS’ LOCK-OUT FEATURE IS DESIGNED TO PREVENT A USED RELOAD FROM BEING REFIRED.: Brand: ECHELON ENDOPATH

## (undated) DEVICE — CONNECTOR DLYS PERITONEUM 2

## (undated) DEVICE — LAP CHOLE: Brand: MEDLINE INDUSTRIES, INC.

## (undated) DEVICE — STAY.SAFE® CATHETER EXTENSION SET WITH LUER-LOCK, 18 INCH: Brand: STAY.SAFE®

## (undated) DEVICE — TROCAR: Brand: KII® SLEEVE

## (undated) DEVICE — INSUFFLATION NEEDLE TO ESTABLISH PNEUMOPERITONEUM.: Brand: INSUFFLATION NEEDLE

## (undated) DEVICE — SUTURE PLAIN GUT 3-0 CT-1

## (undated) DEVICE — SUTURE ETHIBOND EXCEL 0 SH

## (undated) DEVICE — Device: Brand: FABCO

## (undated) DEVICE — [HIGH FLOW INSUFFLATOR,  DO NOT USE IF PACKAGE IS DAMAGED,  KEEP DRY,  KEEP AWAY FROM SUNLIGHT,  PROTECT FROM HEAT AND RADIOACTIVE SOURCES.]: Brand: PNEUMOSURE

## (undated) DEVICE — UNDYED BRAIDED (POLYGLACTIN 910), SYNTHETIC ABSORBABLE SUTURE: Brand: COATED VICRYL

## (undated) DEVICE — 3M™ IOBAN™ 2 ANTIMICROBIAL INCISE DRAPE 6640EZ: Brand: IOBAN™ 2

## (undated) DEVICE — GLOVE BIOGEL M SURG SZ 6-1/2

## (undated) DEVICE — 3M(TM) MEDIPORE(TM) +PAD SOFT CLOTH ADHESIVE WOUND DRESSING 3566: Brand: 3M™ MEDIPORE™

## (undated) NOTE — LETTER
Chantal Montgomery [address] [address]  [place], [address] [facility]    Consent for Operation  Date: __________________                                Time: _______________    1.  I authorize the performance upon Anuj Brady the following operation:  Procedmacie procedure has been videotaped, the surgeon will obtain the original videotape. The hospital will not be responsible for storage or maintenance of this tape.     6. For the purpose of advancing medical education, I consent to the admittance of observers to t STATEMENTS REQUIRING INSERTION OR COMPLETION WERE FILLED IN.     Signature of Patient:   ___________________________    When the patient is a minor or mentally incompetent to give consent:  Signature of person authorized to consent for patient: ____________

## (undated) NOTE — LETTER
[facility] [address], [address] [place] [address]    Consent for Anesthesia   1.    Lanette Jorge agree to be cared for by an anesthesiologist, who is specially trained to monitor me and give me medicine to put me to sleep or keep me comfort vision, nerves, or muscles and in extremely rare instances death. 5. My doctor has explained to me other choices available to me for my care (alternatives).   6. Pregnant Patients (“epidural”):  I understand that the risks of having an epidural (medicine g

## (undated) NOTE — LETTER
[address] [place] [address] Rd, [place], [place]     AUTHORIZATION FOR SURGICAL OPERATION OR PROCEDURE    1.  I hereby authorize Dr. Aidan Armstrong MD, my Physician(s) and whomever may be designated as the doctor's Assistant,to perform the fol Physician. 5. I consent to the photographing of procedure(s) to be performed for the purposes of advancing medicine, science and/or education, provided my identity is not revealed.  If the procedure has been videotaped, the physician/surgeon will obtain th (Witness signature)                                                                                                  (Date)                                (Time)  STATEMENT OF PHYSICIAN My signature below affirms that prior to the time of the procedure;  I

## (undated) NOTE — LETTER
[facility] [address], [address]  Consent for Procedure/Sedation    Date:     Time:       1. I authorize the performance upon Anuj Brady the following:  REMOVAL OF TUNNELED CENTRAL VENOUS CATHETER     2.  I authorize Dr. Nicci Hernandez ________________________________    ___________________    Witness: _________________________      Date: ___________________    Printed: 2019   12:44 PM  Patient Name: Elie Smith        : 1975       Medical Record #: [id number]

## (undated) NOTE — LETTER
Chantal Montgomery [address] [address]  [place], [address] [facility]    Consent for Operation  Date: __________________                                Time: _______________    1.  I authorize the performance upon Anuj Brady the following operation:  Procedmacie procedure has been videotaped, the surgeon will obtain the original videotape. The hospital will not be responsible for storage or maintenance of this tape.   7. For the purpose of advancing medical education, I consent to the admittance of observers to the STATEMENTS REQUIRING INSERTION OR COMPLETION WERE FILLED IN.     Signature of Patient:   ___________________________    When the patient is a minor or mentally incompetent to give consent:  Signature of person authorized to consent for patient: ____________ supplements, and pills I can buy without a prescription (including street drugs/illegal medications). Failure to inform my anesthesiologist about these medicines may increase my risk of anesthetic complications. iv.  If I am allergic to anything or have ha Anesthesiologist Signature     Date   Time  I have discussed the procedure and information above with the patient (or patient’s representative) and answered their questions. The patient or their representative has agreed to have anesthesia services.     ___

## (undated) NOTE — LETTER
19    Patient: Juliann Armstrong  : 1975 Visit date: 2019    Dear  Dr. Quincy Swenson,    Thank you for referring Juliann Armstrong to my practice. Please find my assessment and plan below.         Assessment   Acute appendicitis with localized perit

## (undated) NOTE — LETTER
[facility]  Administration of Anesthesia  1.  Arias Stinson, or _________________________________ acting on his/her behalf, (Patient) (Dependent/Representative) request to  receive anesthesia for my pending procedure/operation/treatme infections, high spinal block, spinal bleeding, seizure, cardiac arrest and death. 7. AWARENESS: I understand that it is possible (but unlikely) to have explicit memory of events from the operating room while under general anesthesia.   8. ELECTROCONVULSIV (Responsible person in case of minor/ unconscious pt) /Relationship    My signature below affirms that prior to the time of the procedure, I have explained to the patient and/or his/her guardian, the risks and benefits o

## (undated) NOTE — ED AVS SNAPSHOT
[facility] Emergency Department  [place] [address]   [place] [address]  Phone:  [phone]  Fax:  [phone]          John Santi   MRN: [id number]    Department:  [facility] Emergency Department   Date of Visit:  7/25/2017 and Class Registration line at [phone] or find a doctor online by visiting [email or web address].    IF THERE IS ANY CHANGE OR WORSENING OF YOUR CONDITION, CALL YOUR PRIMARY CARE PHYSICIAN AT ONCE OR RETURN IMMEDIATELY TO [address] [place] [address].     If

## (undated) NOTE — LETTER
Date & Time: 10/8/2023, 10:17 AM  Patient: Iglesia Riley  Encounter Provider(s):    Delores Campos MD       To Whom It May Concern:    Martine Britton was seen and treated in our department on 10/8/2023. He should not return to work until 10/10/23 .     If you have any questions or concerns, please do not hesitate to call.        _____________________________  Physician/APC Signature

## (undated) NOTE — LETTER
Consent to Procedure/Sedation    Date: 9/6/2019    Time: _______________    1. I authorize the performance upon Anuj Brady the following:    Permanent Dialysis Catheter Insertion    2.  I authorize Dr. Hamilton Chinchilla (and whomever is designated as the doct ___________________________    ___________________    Witness: ____________________     Date: ______________    Printed: 2019   9:06 AM    Patient Name: Gregorio Zaldivar        : 1975       Medical Record #: [id number]

## (undated) NOTE — LETTER
2019    Return to School / Work    Name: Benito Harvey        : 1975    To Whom It May Concern,    Benito Harvey had surgery on 19 and is:    Able to return to school / work with restrictions:  No lifting over: 10 lbs until 10/17/19.